# Patient Record
Sex: MALE | Race: WHITE | NOT HISPANIC OR LATINO | Employment: FULL TIME | URBAN - METROPOLITAN AREA
[De-identification: names, ages, dates, MRNs, and addresses within clinical notes are randomized per-mention and may not be internally consistent; named-entity substitution may affect disease eponyms.]

---

## 2023-03-16 LAB
ALANINE AMINOTRANSFERASE (SGPT) (U/L) IN SER/PLAS: 38 U/L (ref 10–52)
ALBUMIN (G/DL) IN SER/PLAS: 4.8 G/DL (ref 3.4–5)
ALKALINE PHOSPHATASE (U/L) IN SER/PLAS: 54 U/L (ref 33–136)
ANION GAP IN SER/PLAS: 12 MMOL/L (ref 10–20)
APPEARANCE, URINE: CLEAR
ASPARTATE AMINOTRANSFERASE (SGOT) (U/L) IN SER/PLAS: 32 U/L (ref 9–39)
BILIRUBIN TOTAL (MG/DL) IN SER/PLAS: 0.7 MG/DL (ref 0–1.2)
BILIRUBIN, URINE: NEGATIVE
BLOOD, URINE: ABNORMAL
CALCIUM (MG/DL) IN SER/PLAS: 10.1 MG/DL (ref 8.6–10.6)
CARBON DIOXIDE, TOTAL (MMOL/L) IN SER/PLAS: 30 MMOL/L (ref 21–32)
CHLORIDE (MMOL/L) IN SER/PLAS: 100 MMOL/L (ref 98–107)
COLOR, URINE: YELLOW
CREATININE (MG/DL) IN SER/PLAS: 1.29 MG/DL (ref 0.5–1.3)
ERYTHROCYTE DISTRIBUTION WIDTH (RATIO) BY AUTOMATED COUNT: 13.2 % (ref 11.5–14.5)
ERYTHROCYTE MEAN CORPUSCULAR HEMOGLOBIN CONCENTRATION (G/DL) BY AUTOMATED: 33.6 G/DL (ref 32–36)
ERYTHROCYTE MEAN CORPUSCULAR VOLUME (FL) BY AUTOMATED COUNT: 92 FL (ref 80–100)
ERYTHROCYTES (10*6/UL) IN BLOOD BY AUTOMATED COUNT: 4.75 X10E12/L (ref 4.5–5.9)
GFR MALE: 62 ML/MIN/1.73M2
GLUCOSE (MG/DL) IN SER/PLAS: 88 MG/DL (ref 74–99)
GLUCOSE, URINE: NEGATIVE MG/DL
HEMATOCRIT (%) IN BLOOD BY AUTOMATED COUNT: 43.8 % (ref 41–52)
HEMOGLOBIN (G/DL) IN BLOOD: 14.7 G/DL (ref 13.5–17.5)
KETONES, URINE: NEGATIVE MG/DL
LEUKOCYTE ESTERASE, URINE: NEGATIVE
LEUKOCYTES (10*3/UL) IN BLOOD BY AUTOMATED COUNT: 5.2 X10E9/L (ref 4.4–11.3)
MUCUS, URINE: ABNORMAL /LPF
NITRITE, URINE: NEGATIVE
NRBC (PER 100 WBCS) BY AUTOMATED COUNT: 0 /100 WBC (ref 0–0)
PH, URINE: 6 (ref 5–8)
PLATELETS (10*3/UL) IN BLOOD AUTOMATED COUNT: 177 X10E9/L (ref 150–450)
POTASSIUM (MMOL/L) IN SER/PLAS: 4.4 MMOL/L (ref 3.5–5.3)
PROSTATE SPECIFIC AG (NG/ML) IN SER/PLAS: 0.21 NG/ML (ref 0–4)
PROTEIN TOTAL: 7.5 G/DL (ref 6.4–8.2)
PROTEIN, URINE: ABNORMAL MG/DL
RBC, URINE: 10 /HPF (ref 0–5)
SEDIMENTATION RATE, ERYTHROCYTE: 15 MM/H (ref 0–20)
SODIUM (MMOL/L) IN SER/PLAS: 138 MMOL/L (ref 136–145)
SPECIFIC GRAVITY, URINE: 1.02 (ref 1–1.03)
UREA NITROGEN (MG/DL) IN SER/PLAS: 24 MG/DL (ref 6–23)
UROBILINOGEN, URINE: <2 MG/DL (ref 0–1.9)
WBC, URINE: ABNORMAL /HPF (ref 0–5)

## 2023-05-12 ENCOUNTER — OFFICE VISIT (OUTPATIENT)
Dept: PRIMARY CARE | Facility: CLINIC | Age: 63
End: 2023-05-12
Payer: COMMERCIAL

## 2023-05-12 VITALS
SYSTOLIC BLOOD PRESSURE: 114 MMHG | WEIGHT: 194 LBS | OXYGEN SATURATION: 96 % | TEMPERATURE: 97.3 F | DIASTOLIC BLOOD PRESSURE: 70 MMHG | RESPIRATION RATE: 12 BRPM | HEART RATE: 71 BPM | BODY MASS INDEX: 28.73 KG/M2 | HEIGHT: 69 IN

## 2023-05-12 DIAGNOSIS — R31.9 CHRONIC PROSTATITIS WITH HEMATURIA: ICD-10-CM

## 2023-05-12 DIAGNOSIS — I25.10 CAD IN NATIVE ARTERY: ICD-10-CM

## 2023-05-12 DIAGNOSIS — C61 ADENOCARCINOMA OF PROSTATE (MULTI): ICD-10-CM

## 2023-05-12 DIAGNOSIS — N41.1 CHRONIC PROSTATITIS WITH HEMATURIA: ICD-10-CM

## 2023-05-12 DIAGNOSIS — I71.21 ANEURYSM OF ASCENDING AORTA WITHOUT RUPTURE (CMS-HCC): ICD-10-CM

## 2023-05-12 DIAGNOSIS — Z00.00 HEALTHCARE MAINTENANCE: Primary | ICD-10-CM

## 2023-05-12 DIAGNOSIS — I10 PRIMARY HYPERTENSION: ICD-10-CM

## 2023-05-12 PROBLEM — R10.9 ABDOMINAL PAIN: Status: ACTIVE | Noted: 2023-05-12

## 2023-05-12 PROBLEM — R79.89 ABNORMAL CBC MEASUREMENT: Status: ACTIVE | Noted: 2023-05-12

## 2023-05-12 PROBLEM — K57.32 DIVERTICULITIS OF COLON: Status: RESOLVED | Noted: 2023-05-12 | Resolved: 2023-05-12

## 2023-05-12 PROBLEM — R39.89 SENSATION OF PRESSURE IN BLADDER AREA: Status: ACTIVE | Noted: 2023-05-12

## 2023-05-12 PROBLEM — E66.9 OBESITY: Status: ACTIVE | Noted: 2023-05-12

## 2023-05-12 PROBLEM — R97.20 ELEVATED PSA: Status: ACTIVE | Noted: 2023-05-12

## 2023-05-12 PROBLEM — R35.0 INCREASED URINARY FREQUENCY: Status: ACTIVE | Noted: 2023-05-12

## 2023-05-12 PROBLEM — N48.6 PEYRONIE'S DISEASE: Status: ACTIVE | Noted: 2023-05-12

## 2023-05-12 PROBLEM — R74.8 ELEVATED LIVER ENZYMES: Status: ACTIVE | Noted: 2023-05-12

## 2023-05-12 PROBLEM — B00.9 HERPES SIMPLEX: Status: ACTIVE | Noted: 2023-05-12

## 2023-05-12 PROBLEM — R09.81 NASAL CONGESTION: Status: ACTIVE | Noted: 2023-05-12

## 2023-05-12 PROBLEM — H66.91 OTITIS MEDIA, RIGHT: Status: ACTIVE | Noted: 2023-05-12

## 2023-05-12 PROBLEM — R09.81 NASAL CONGESTION: Status: RESOLVED | Noted: 2023-05-12 | Resolved: 2023-05-12

## 2023-05-12 PROBLEM — J01.90 ACUTE SINUSITIS: Status: ACTIVE | Noted: 2023-05-12

## 2023-05-12 PROBLEM — F10.10 ALCOHOL ABUSE, DAILY USE: Status: ACTIVE | Noted: 2023-05-12

## 2023-05-12 PROBLEM — K57.32 DIVERTICULITIS OF COLON: Status: ACTIVE | Noted: 2023-05-12

## 2023-05-12 PROBLEM — I71.20 THORACIC AORTIC ANEURYSM WITHOUT RUPTURE (CMS-HCC): Status: ACTIVE | Noted: 2023-05-12

## 2023-05-12 PROBLEM — F10.10 ALCOHOL ABUSE, DAILY USE: Status: RESOLVED | Noted: 2023-05-12 | Resolved: 2023-05-12

## 2023-05-12 PROBLEM — R35.0 INCREASED URINARY FREQUENCY: Status: RESOLVED | Noted: 2023-05-12 | Resolved: 2023-05-12

## 2023-05-12 PROBLEM — R10.9 ABDOMINAL PAIN: Status: RESOLVED | Noted: 2023-05-12 | Resolved: 2023-05-12

## 2023-05-12 PROBLEM — J01.90 ACUTE SINUSITIS: Status: RESOLVED | Noted: 2023-05-12 | Resolved: 2023-05-12

## 2023-05-12 PROBLEM — N52.9 MALE ERECTILE DISORDER: Status: ACTIVE | Noted: 2023-05-12

## 2023-05-12 PROBLEM — H66.91 OTITIS MEDIA, RIGHT: Status: RESOLVED | Noted: 2023-05-12 | Resolved: 2023-05-12

## 2023-05-12 PROBLEM — K64.9 HEMORRHOIDS: Status: ACTIVE | Noted: 2023-05-12

## 2023-05-12 PROCEDURE — 1036F TOBACCO NON-USER: CPT | Performed by: FAMILY MEDICINE

## 2023-05-12 PROCEDURE — 3074F SYST BP LT 130 MM HG: CPT | Performed by: FAMILY MEDICINE

## 2023-05-12 PROCEDURE — 99396 PREV VISIT EST AGE 40-64: CPT | Performed by: FAMILY MEDICINE

## 2023-05-12 PROCEDURE — 3078F DIAST BP <80 MM HG: CPT | Performed by: FAMILY MEDICINE

## 2023-05-12 RX ORDER — ATORVASTATIN CALCIUM 10 MG/1
1 TABLET, FILM COATED ORAL DAILY
COMMUNITY
Start: 2021-08-23 | End: 2023-05-12 | Stop reason: SDUPTHER

## 2023-05-12 RX ORDER — LOSARTAN POTASSIUM AND HYDROCHLOROTHIAZIDE 25; 100 MG/1; MG/1
1 TABLET ORAL DAILY
Qty: 90 TABLET | Refills: 1 | Status: SHIPPED | OUTPATIENT
Start: 2023-05-12 | End: 2023-05-17

## 2023-05-12 RX ORDER — TAMSULOSIN HYDROCHLORIDE 0.4 MG/1
1 CAPSULE ORAL DAILY
COMMUNITY
Start: 2019-12-26 | End: 2023-05-12 | Stop reason: SDUPTHER

## 2023-05-12 RX ORDER — TAMSULOSIN HYDROCHLORIDE 0.4 MG/1
0.4 CAPSULE ORAL DAILY
Qty: 90 CAPSULE | Refills: 1 | Status: SHIPPED | OUTPATIENT
Start: 2023-05-12 | End: 2024-05-13 | Stop reason: SDUPTHER

## 2023-05-12 RX ORDER — ATORVASTATIN CALCIUM 10 MG/1
20 TABLET, FILM COATED ORAL DAILY
Qty: 90 TABLET | Refills: 1 | Status: SHIPPED | OUTPATIENT
Start: 2023-05-12 | End: 2023-11-06 | Stop reason: WASHOUT

## 2023-05-12 RX ORDER — LOSARTAN POTASSIUM AND HYDROCHLOROTHIAZIDE 25; 100 MG/1; MG/1
1 TABLET ORAL DAILY
COMMUNITY
Start: 2019-02-06 | End: 2023-05-12 | Stop reason: SDUPTHER

## 2023-05-12 RX ORDER — ASPIRIN 81 MG/1
81 TABLET ORAL DAILY
Qty: 90 TABLET | Refills: 3 | Status: SHIPPED | OUTPATIENT
Start: 2023-05-12 | End: 2024-05-11

## 2023-05-12 ASSESSMENT — ENCOUNTER SYMPTOMS
DIFFICULTY URINATING: 0
BRUISES/BLEEDS EASILY: 0
DYSPHORIC MOOD: 0
ABDOMINAL DISTENTION: 0
CHEST TIGHTNESS: 0
EYE PAIN: 0
COUGH: 0
CONSTIPATION: 0
ABDOMINAL PAIN: 0
DYSURIA: 0
FATIGUE: 0
FEVER: 0
ARTHRALGIAS: 0
DIZZINESS: 0
WEAKNESS: 0
EYE REDNESS: 0
BACK PAIN: 0
HEADACHES: 0
SHORTNESS OF BREATH: 0
DIARRHEA: 0
APPETITE CHANGE: 0
SORE THROAT: 0
NERVOUS/ANXIOUS: 0
CHILLS: 0
ADENOPATHY: 0
BLOOD IN STOOL: 0

## 2023-05-12 NOTE — PROGRESS NOTES
"Subjective   Patient ID: Eliud Mckeon is a 62 y.o. male who presents for Annual Exam.  PMHX, PSHx, Fam hx, and Social hx reviewed.   New concerns none  Vaccines current  Dentist seen at least yearly yes  Vision concerns none  Hearing concerns none  Diet is  overall healthy.   Smoker - no  Alcohol use - 14 drinks/week  Exercising 4-5 days per week.   Sexually active - yes, no issues  Colonoscopy 2019           Review of Systems   Constitutional:  Negative for appetite change, chills, fatigue and fever.   HENT:  Negative for congestion, hearing loss and sore throat.    Eyes:  Negative for pain, redness and visual disturbance.   Respiratory:  Negative for cough, chest tightness and shortness of breath.    Cardiovascular:  Negative for chest pain and leg swelling.   Gastrointestinal:  Negative for abdominal distention, abdominal pain, blood in stool, constipation and diarrhea.   Genitourinary:  Negative for difficulty urinating and dysuria.   Musculoskeletal:  Negative for arthralgias and back pain.   Skin:  Negative for rash.   Neurological:  Negative for dizziness, weakness and headaches.   Hematological:  Negative for adenopathy. Does not bruise/bleed easily.   Psychiatric/Behavioral:  Negative for dysphoric mood. The patient is not nervous/anxious.        Objective   /70   Pulse 71   Temp 36.3 °C (97.3 °F)   Resp 12   Ht 1.753 m (5' 9\")   Wt 88 kg (194 lb)   SpO2 96%   BMI 28.65 kg/m²    Physical Exam  Constitutional:       General: He is not in acute distress.     Appearance: Normal appearance. He is not ill-appearing.   HENT:      Head: Normocephalic and atraumatic.      Right Ear: Tympanic membrane, ear canal and external ear normal.      Left Ear: Tympanic membrane, ear canal and external ear normal.      Nose: Nose normal.      Mouth/Throat:      Mouth: Mucous membranes are moist.      Pharynx: No oropharyngeal exudate or posterior oropharyngeal erythema.   Eyes:      Extraocular Movements: " Extraocular movements intact.      Conjunctiva/sclera: Conjunctivae normal.      Pupils: Pupils are equal, round, and reactive to light.   Neck:      Vascular: No carotid bruit.   Cardiovascular:      Rate and Rhythm: Normal rate and regular rhythm.      Heart sounds: Normal heart sounds. No murmur heard.  Pulmonary:      Effort: Pulmonary effort is normal.      Breath sounds: Normal breath sounds. No wheezing, rhonchi or rales.   Abdominal:      General: Bowel sounds are normal. There is no distension.      Palpations: Abdomen is soft. There is no mass.      Tenderness: There is no abdominal tenderness.   Musculoskeletal:         General: No swelling or deformity.      Cervical back: Neck supple. No tenderness.   Lymphadenopathy:      Cervical: No cervical adenopathy.   Skin:     General: Skin is warm and dry.      Findings: No lesion or rash.   Neurological:      Mental Status: He is alert and oriented to person, place, and time.      Sensory: No sensory deficit.      Motor: No weakness.      Coordination: Coordination normal.      Deep Tendon Reflexes: Reflexes normal.   Psychiatric:         Mood and Affect: Mood normal.         Behavior: Behavior normal.         Judgment: Judgment normal.           Assessment/Plan   Diagnoses and all orders for this visit:  Healthcare maintenance - vaccines current, labs reviewed and result. Colonoscopy current.  Primary hypertension - well controlled, continue to monitor  Aneurysm of ascending aorta without rupture (CMS/HCC) - recheck Echo  Adenocarcinoma of prostate (CMS/HCC) -post seeding, PSA stable  Chronic prostatitis with hematuria - stable, monitor.  Follow up in 6 months, 15min

## 2023-05-12 NOTE — PROGRESS NOTES
"Subjective   Patient ID: Eliud Mckeon is a 62 y.o. male who presents for Annual Exam.    HPI     Review of Systems    Objective   /70   Pulse 71   Temp 36.3 °C (97.3 °F)   Resp 12   Ht 1.753 m (5' 9\")   Wt 88 kg (194 lb)   SpO2 96%   BMI 28.65 kg/m²     Physical Exam    Assessment/Plan          "

## 2023-05-17 DIAGNOSIS — I10 PRIMARY HYPERTENSION: ICD-10-CM

## 2023-05-17 RX ORDER — LOSARTAN POTASSIUM AND HYDROCHLOROTHIAZIDE 25; 100 MG/1; MG/1
TABLET ORAL
Qty: 90 TABLET | Refills: 1 | Status: SHIPPED | OUTPATIENT
Start: 2023-05-17 | End: 2023-11-06 | Stop reason: SDUPTHER

## 2023-06-08 ENCOUNTER — TELEPHONE (OUTPATIENT)
Dept: PRIMARY CARE | Facility: CLINIC | Age: 63
End: 2023-06-08
Payer: COMMERCIAL

## 2023-06-08 NOTE — TELEPHONE ENCOUNTER
----- Message from Daniel Fitzpatrick MD sent at 6/7/2023  2:49 PM EDT -----  Echo shows mild ascending aortic aneurysm is stable. There is note of diastolic dysfunction for which no tx or further eval is needed, but good BP control is important. Will recheck Echo in 1-2yrs to monitor.  ----- Message -----  From: Interface, Syng[facility] - Cardiology Results In  Sent: 6/7/2023   2:05 PM EDT  To: Daniel Fitzpatrick MD

## 2023-07-04 DIAGNOSIS — I25.10 CAD IN NATIVE ARTERY: Primary | ICD-10-CM

## 2023-07-06 RX ORDER — ATORVASTATIN CALCIUM 20 MG/1
TABLET, FILM COATED ORAL
Qty: 45 TABLET | Refills: 1 | Status: SHIPPED | OUTPATIENT
Start: 2023-07-06 | End: 2023-11-06 | Stop reason: SDUPTHER

## 2023-10-31 ENCOUNTER — LAB (OUTPATIENT)
Dept: LAB | Facility: LAB | Age: 63
End: 2023-10-31
Payer: COMMERCIAL

## 2023-10-31 ENCOUNTER — TELEPHONE (OUTPATIENT)
Dept: PRIMARY CARE | Facility: CLINIC | Age: 63
End: 2023-10-31

## 2023-10-31 DIAGNOSIS — Z00.00 HEALTHCARE MAINTENANCE: ICD-10-CM

## 2023-10-31 DIAGNOSIS — I25.10 CAD IN NATIVE ARTERY: ICD-10-CM

## 2023-10-31 DIAGNOSIS — R31.1 BENIGN ESSENTIAL MICROSCOPIC HEMATURIA: ICD-10-CM

## 2023-10-31 DIAGNOSIS — R79.89 ABNORMAL CBC MEASUREMENT: ICD-10-CM

## 2023-10-31 DIAGNOSIS — I10 PRIMARY HYPERTENSION: Primary | ICD-10-CM

## 2023-10-31 LAB
ALBUMIN SERPL BCP-MCNC: 4.5 G/DL (ref 3.4–5)
ALP SERPL-CCNC: 53 U/L (ref 33–136)
ALT SERPL W P-5'-P-CCNC: 35 U/L (ref 10–52)
ANION GAP SERPL CALC-SCNC: 14 MMOL/L (ref 10–20)
APPEARANCE UR: CLEAR
AST SERPL W P-5'-P-CCNC: 33 U/L (ref 9–39)
BILIRUB SERPL-MCNC: 0.4 MG/DL (ref 0–1.2)
BILIRUB UR STRIP.AUTO-MCNC: NEGATIVE MG/DL
BUN SERPL-MCNC: 23 MG/DL (ref 6–23)
CALCIUM SERPL-MCNC: 9.5 MG/DL (ref 8.6–10.6)
CHLORIDE SERPL-SCNC: 99 MMOL/L (ref 98–107)
CHOLEST SERPL-MCNC: 149 MG/DL (ref 0–199)
CHOLESTEROL/HDL RATIO: 2.4
CO2 SERPL-SCNC: 27 MMOL/L (ref 21–32)
COLOR UR: YELLOW
CREAT SERPL-MCNC: 1.42 MG/DL (ref 0.5–1.3)
ERYTHROCYTE [DISTWIDTH] IN BLOOD BY AUTOMATED COUNT: 13 % (ref 11.5–14.5)
GFR SERPL CREATININE-BSD FRML MDRD: 56 ML/MIN/1.73M*2
GLUCOSE SERPL-MCNC: 97 MG/DL (ref 74–99)
GLUCOSE UR STRIP.AUTO-MCNC: NEGATIVE MG/DL
HCT VFR BLD AUTO: 41.3 % (ref 41–52)
HCV AB SER QL: NONREACTIVE
HDLC SERPL-MCNC: 63.4 MG/DL
HGB BLD-MCNC: 14.2 G/DL (ref 13.5–17.5)
KETONES UR STRIP.AUTO-MCNC: NEGATIVE MG/DL
LDLC SERPL CALC-MCNC: 73 MG/DL
LEUKOCYTE ESTERASE UR QL STRIP.AUTO: NEGATIVE
MCH RBC QN AUTO: 31.8 PG (ref 26–34)
MCHC RBC AUTO-ENTMCNC: 34.4 G/DL (ref 32–36)
MCV RBC AUTO: 93 FL (ref 80–100)
NITRITE UR QL STRIP.AUTO: NEGATIVE
NON HDL CHOLESTEROL: 86 MG/DL (ref 0–149)
NRBC BLD-RTO: 0 /100 WBCS (ref 0–0)
PH UR STRIP.AUTO: 6 [PH]
PLATELET # BLD AUTO: 215 X10*3/UL (ref 150–450)
PMV BLD AUTO: 10.4 FL (ref 7.5–11.5)
POTASSIUM SERPL-SCNC: 4.1 MMOL/L (ref 3.5–5.3)
PROT SERPL-MCNC: 6.9 G/DL (ref 6.4–8.2)
PROT UR STRIP.AUTO-MCNC: ABNORMAL MG/DL
RBC # BLD AUTO: 4.46 X10*6/UL (ref 4.5–5.9)
RBC # UR STRIP.AUTO: ABNORMAL /UL
RBC #/AREA URNS AUTO: NORMAL /HPF
SODIUM SERPL-SCNC: 136 MMOL/L (ref 136–145)
SP GR UR STRIP.AUTO: 1.01
TRIGL SERPL-MCNC: 63 MG/DL (ref 0–149)
UROBILINOGEN UR STRIP.AUTO-MCNC: <2 MG/DL
VLDL: 13 MG/DL (ref 0–40)
WBC # BLD AUTO: 4.6 X10*3/UL (ref 4.4–11.3)
WBC #/AREA URNS AUTO: NORMAL /HPF

## 2023-10-31 PROCEDURE — 85027 COMPLETE CBC AUTOMATED: CPT

## 2023-10-31 PROCEDURE — 80053 COMPREHEN METABOLIC PANEL: CPT

## 2023-10-31 PROCEDURE — 81001 URINALYSIS AUTO W/SCOPE: CPT

## 2023-10-31 PROCEDURE — 36415 COLL VENOUS BLD VENIPUNCTURE: CPT

## 2023-10-31 PROCEDURE — 86803 HEPATITIS C AB TEST: CPT

## 2023-10-31 PROCEDURE — 80061 LIPID PANEL: CPT

## 2023-11-06 ENCOUNTER — ANCILLARY PROCEDURE (OUTPATIENT)
Dept: RADIOLOGY | Facility: CLINIC | Age: 63
End: 2023-11-06
Payer: COMMERCIAL

## 2023-11-06 ENCOUNTER — OFFICE VISIT (OUTPATIENT)
Dept: PRIMARY CARE | Facility: CLINIC | Age: 63
End: 2023-11-06
Payer: COMMERCIAL

## 2023-11-06 VITALS
RESPIRATION RATE: 12 BRPM | WEIGHT: 199 LBS | OXYGEN SATURATION: 96 % | BODY MASS INDEX: 29.47 KG/M2 | HEIGHT: 69 IN | SYSTOLIC BLOOD PRESSURE: 118 MMHG | HEART RATE: 76 BPM | DIASTOLIC BLOOD PRESSURE: 64 MMHG

## 2023-11-06 DIAGNOSIS — C61 ADENOCARCINOMA OF PROSTATE (MULTI): ICD-10-CM

## 2023-11-06 DIAGNOSIS — E78.00 ELEVATED LDL CHOLESTEROL LEVEL: ICD-10-CM

## 2023-11-06 DIAGNOSIS — R07.89 CHEST TIGHTNESS: Primary | ICD-10-CM

## 2023-11-06 DIAGNOSIS — I10 PRIMARY HYPERTENSION: ICD-10-CM

## 2023-11-06 DIAGNOSIS — I25.10 CAD IN NATIVE ARTERY: ICD-10-CM

## 2023-11-06 DIAGNOSIS — I71.21 ANEURYSM OF ASCENDING AORTA WITHOUT RUPTURE (CMS-HCC): ICD-10-CM

## 2023-11-06 DIAGNOSIS — R07.89 CHEST TIGHTNESS: ICD-10-CM

## 2023-11-06 PROCEDURE — 99214 OFFICE O/P EST MOD 30 MIN: CPT | Performed by: FAMILY MEDICINE

## 2023-11-06 PROCEDURE — 3074F SYST BP LT 130 MM HG: CPT | Performed by: FAMILY MEDICINE

## 2023-11-06 PROCEDURE — 71046 X-RAY EXAM CHEST 2 VIEWS: CPT

## 2023-11-06 PROCEDURE — 1036F TOBACCO NON-USER: CPT | Performed by: FAMILY MEDICINE

## 2023-11-06 PROCEDURE — 93000 ELECTROCARDIOGRAM COMPLETE: CPT | Performed by: FAMILY MEDICINE

## 2023-11-06 PROCEDURE — 71046 X-RAY EXAM CHEST 2 VIEWS: CPT | Performed by: RADIOLOGY

## 2023-11-06 PROCEDURE — 3078F DIAST BP <80 MM HG: CPT | Performed by: FAMILY MEDICINE

## 2023-11-06 RX ORDER — LOSARTAN POTASSIUM AND HYDROCHLOROTHIAZIDE 25; 100 MG/1; MG/1
1 TABLET ORAL DAILY
Qty: 90 TABLET | Refills: 1 | Status: SHIPPED | OUTPATIENT
Start: 2023-11-06 | End: 2024-05-13 | Stop reason: SDUPTHER

## 2023-11-06 RX ORDER — ATORVASTATIN CALCIUM 20 MG/1
TABLET, FILM COATED ORAL
Qty: 90 TABLET | Refills: 1 | Status: SHIPPED | OUTPATIENT
Start: 2023-11-06 | End: 2024-05-13 | Stop reason: SDUPTHER

## 2023-11-06 ASSESSMENT — ENCOUNTER SYMPTOMS
COUGH: 0
CONSTIPATION: 0
APPETITE CHANGE: 0
BLOOD IN STOOL: 0
DIARRHEA: 0
FEVER: 0
DYSURIA: 0
DYSPHORIC MOOD: 0
CHEST TIGHTNESS: 1
DIZZINESS: 0
ABDOMINAL PAIN: 0
ARTHRALGIAS: 0
DIFFICULTY URINATING: 0
NERVOUS/ANXIOUS: 0
HEADACHES: 0
BRUISES/BLEEDS EASILY: 0
SORE THROAT: 0
ABDOMINAL DISTENTION: 0
SHORTNESS OF BREATH: 0
EYE PAIN: 0
CHILLS: 0
EYE REDNESS: 0
BACK PAIN: 0
FATIGUE: 0
WEAKNESS: 0
ADENOPATHY: 0

## 2023-11-06 NOTE — PROGRESS NOTES
"Subjective   Patient ID: Eliud Mckeon is a 63 y.o. male who presents for Follow-up.    HPI     Review of Systems    Objective   /64   Pulse 76   Resp 12   Ht 1.753 m (5' 9\")   Wt 90.3 kg (199 lb)   SpO2 96%   BMI 29.39 kg/m²     Physical Exam    Assessment/Plan          "

## 2023-11-06 NOTE — PROGRESS NOTES
Subjective   Patient ID: Eliud Mckeon is a 63 y.o. male who presents for Follow-up.  Pt has chest tightness that comes and goes about 2-3 days per week. Lasts seconds and is positional. He walks for exercise and tightness has not been exertional . Onset was 2months.    Pt reports symptoms are unchanged. He has also had an episode of LH about 2weeks ago. He thinks due to dehydration and didn't eat breakfast.      Pt has chronic and stable HTN, ascending aortic aneurysm and CAD.  Pt is taking Losart/HCTZ. Tolerating well.  Exercising 4-5 days per week   Low sodium diet is not being followed.   Is monitoring home blood pressures. Readings range 120s/80s.  Denies HA, vision changes.     Pt has Dyslipidemia.   Lipid panel showed LDL 73.  Currently taking Atorvastatin and is tolerating well without muscle pains or weakness.     BPH/ Prostate Cancer/ Hematuria monitored . Last PSA was 0.4, a little higher . Urinary stream is good and he denies difficulty starting urination or emptying bladder.                 Review of Systems   Constitutional:  Negative for appetite change, chills, fatigue and fever.   HENT:  Negative for congestion, hearing loss and sore throat.    Eyes:  Negative for pain, redness and visual disturbance.   Respiratory:  Positive for chest tightness. Negative for cough and shortness of breath.    Cardiovascular:  Negative for chest pain and leg swelling.   Gastrointestinal:  Negative for abdominal distention, abdominal pain, blood in stool, constipation and diarrhea.   Genitourinary:  Negative for difficulty urinating and dysuria.   Musculoskeletal:  Negative for arthralgias and back pain.   Skin:  Negative for rash.   Neurological:  Negative for dizziness, weakness and headaches.   Hematological:  Negative for adenopathy. Does not bruise/bleed easily.   Psychiatric/Behavioral:  Negative for dysphoric mood. The patient is not nervous/anxious.        Objective   /64   Pulse 76   Resp 12   Ht  "1.753 m (5' 9\")   Wt 90.3 kg (199 lb)   SpO2 96%   BMI 29.39 kg/m²    Physical Exam  Constitutional:       General: He is not in acute distress.     Appearance: Normal appearance.   Cardiovascular:      Rate and Rhythm: Normal rate and regular rhythm.      Heart sounds: Normal heart sounds. No murmur heard.  Pulmonary:      Effort: Pulmonary effort is normal.      Breath sounds: Normal breath sounds.   Chest:      Chest wall: No tenderness.   Abdominal:      Palpations: Abdomen is soft.      Tenderness: There is no abdominal tenderness.   Neurological:      Mental Status: He is alert.   Psychiatric:         Mood and Affect: Mood normal.         Judgment: Judgment normal.           Assessment/Plan   Diagnoses and all orders for this visit:  Chest tightness - EKG ok, ordering stress test and CXR.  CAD in native artery - stable, continue ASA and statin.  Primary hypertension - very well controlled, continue current dose on BP med  Ascending aortic aneurysm - due to recheck, will check with Echo.  BPH / Prostate Ca - doing well monitoring PSA with Urology.    Follow up in 6 months, 30min for preventative       "

## 2023-11-07 ENCOUNTER — TELEPHONE (OUTPATIENT)
Dept: PRIMARY CARE | Facility: CLINIC | Age: 63
End: 2023-11-07
Payer: COMMERCIAL

## 2023-11-07 NOTE — TELEPHONE ENCOUNTER
----- Message from Daniel Fitzpatrick MD sent at 11/7/2023 11:14 AM EST -----  CXR is negative  ----- Message -----  From: Interface, Radiology Results In  Sent: 11/7/2023  10:55 AM EST  To: Daniel Fitzpatrick MD

## 2023-11-08 DIAGNOSIS — R31.9 CHRONIC PROSTATITIS WITH HEMATURIA: ICD-10-CM

## 2023-11-08 DIAGNOSIS — N41.1 CHRONIC PROSTATITIS WITH HEMATURIA: ICD-10-CM

## 2023-11-17 ENCOUNTER — APPOINTMENT (OUTPATIENT)
Dept: PRIMARY CARE | Facility: CLINIC | Age: 63
End: 2023-11-17
Payer: COMMERCIAL

## 2023-12-19 ENCOUNTER — HOSPITAL ENCOUNTER (OUTPATIENT)
Dept: CARDIOLOGY | Facility: CLINIC | Age: 63
Discharge: HOME | End: 2023-12-19
Payer: COMMERCIAL

## 2023-12-19 VITALS
BODY MASS INDEX: 29.47 KG/M2 | HEIGHT: 69 IN | SYSTOLIC BLOOD PRESSURE: 118 MMHG | WEIGHT: 199 LBS | DIASTOLIC BLOOD PRESSURE: 64 MMHG

## 2023-12-19 DIAGNOSIS — I71.21 ANEURYSM OF ASCENDING AORTA WITHOUT RUPTURE (CMS-HCC): ICD-10-CM

## 2023-12-19 DIAGNOSIS — R07.89 CHEST TIGHTNESS: ICD-10-CM

## 2023-12-19 LAB
AORTIC VALVE PEAK VELOCITY: 1.47
AV PEAK GRADIENT: 8.6
AVA (PEAK VEL): 3.22
LEFT ATRIUM VOLUME AREA LENGTH INDEX BSA: 41.6
LEFT VENTRICLE INTERNAL DIMENSION DIASTOLE: 4.78 (ref 3.5–6)
LEFT VENTRICULAR OUTFLOW TRACT DIAMETER: 2.22
MITRAL VALVE E/A RATIO: 0.82
MITRAL VALVE E/E' RATIO: 7.37
RIGHT VENTRICLE FREE WALL PEAK S': 12.62
RIGHT VENTRICLE PEAK SYSTOLIC PRESSURE: 22
TRICUSPID ANNULAR PLANE SYSTOLIC EXCURSION: 2.8

## 2023-12-19 PROCEDURE — 93306 TTE W/DOPPLER COMPLETE: CPT | Performed by: INTERNAL MEDICINE

## 2023-12-19 PROCEDURE — 93306 TTE W/DOPPLER COMPLETE: CPT

## 2023-12-20 ENCOUNTER — HOSPITAL ENCOUNTER (OUTPATIENT)
Dept: CARDIOLOGY | Facility: HOSPITAL | Age: 63
Discharge: HOME | End: 2023-12-20
Payer: COMMERCIAL

## 2023-12-20 DIAGNOSIS — R07.89 CHEST TIGHTNESS: ICD-10-CM

## 2023-12-20 PROCEDURE — 93016 CV STRESS TEST SUPVJ ONLY: CPT | Performed by: STUDENT IN AN ORGANIZED HEALTH CARE EDUCATION/TRAINING PROGRAM

## 2023-12-20 PROCEDURE — 93017 CV STRESS TEST TRACING ONLY: CPT

## 2023-12-20 PROCEDURE — 93018 CV STRESS TEST I&R ONLY: CPT | Performed by: STUDENT IN AN ORGANIZED HEALTH CARE EDUCATION/TRAINING PROGRAM

## 2023-12-21 ENCOUNTER — TELEPHONE (OUTPATIENT)
Dept: PRIMARY CARE | Facility: CLINIC | Age: 63
End: 2023-12-21
Payer: COMMERCIAL

## 2023-12-21 NOTE — TELEPHONE ENCOUNTER
----- Message from Daniel Fitzpatrick MD sent at 12/20/2023  5:59 PM EST -----  Stress test is normal  ----- Message -----  From: Esequiel, [facility]ng[facility] - Cardiology Results In  Sent: 12/20/2023   5:19 PM EST  To: Daniel Fitzpatrick MD

## 2023-12-21 NOTE — TELEPHONE ENCOUNTER
----- Message from Daniel Fitzpatrick MD sent at 12/19/2023 10:49 AM EST -----  Echo stable, ascending aortic aneurysm unchanged. Will monitor with Echo or CT at 1yr  ----- Message -----  From: Esequiel, [facility]ng[facility] - Cardiology Results In  Sent: 12/19/2023  10:42 AM EST  To: Daniel Fitzpatrick MD

## 2023-12-29 RX ORDER — TAMSULOSIN HYDROCHLORIDE 0.4 MG/1
0.4 CAPSULE ORAL DAILY
Qty: 90 CAPSULE | Refills: 1 | OUTPATIENT
Start: 2023-12-29

## 2024-03-04 DIAGNOSIS — C61 MALIGNANT NEOPLASM OF PROSTATE (MULTI): Primary | ICD-10-CM

## 2024-03-11 ENCOUNTER — TELEPHONE (OUTPATIENT)
Dept: PRIMARY CARE | Facility: CLINIC | Age: 64
End: 2024-03-11
Payer: COMMERCIAL

## 2024-03-11 ENCOUNTER — APPOINTMENT (OUTPATIENT)
Dept: PRIMARY CARE | Facility: CLINIC | Age: 64
End: 2024-03-11
Payer: COMMERCIAL

## 2024-03-11 NOTE — TELEPHONE ENCOUNTER
Pt wife LVM stating pt is having issues with dizziness. Unable to contact pt or wife to set appt for sick visit.

## 2024-03-21 ENCOUNTER — TELEPHONE (OUTPATIENT)
Dept: RADIATION ONCOLOGY | Facility: HOSPITAL | Age: 64
End: 2024-03-21
Payer: COMMERCIAL

## 2024-03-21 NOTE — TELEPHONE ENCOUNTER
Called pt. to remind of appointment on 3/26/2026 at 10:30 am with RAÚL Mckeon. Pt's phone went to voicemail left number if needs to reschedule.

## 2024-03-22 ENCOUNTER — LAB (OUTPATIENT)
Dept: LAB | Facility: LAB | Age: 64
End: 2024-03-22
Payer: COMMERCIAL

## 2024-03-22 DIAGNOSIS — C61 MALIGNANT NEOPLASM OF PROSTATE (MULTI): ICD-10-CM

## 2024-03-22 LAB — PSA SERPL-MCNC: 0.23 NG/ML

## 2024-03-22 PROCEDURE — 84153 ASSAY OF PSA TOTAL: CPT

## 2024-03-22 PROCEDURE — 36415 COLL VENOUS BLD VENIPUNCTURE: CPT

## 2024-04-02 ENCOUNTER — TELEPHONE (OUTPATIENT)
Dept: RADIATION ONCOLOGY | Facility: HOSPITAL | Age: 64
End: 2024-04-02
Payer: COMMERCIAL

## 2024-04-02 NOTE — PROGRESS NOTES
Cancer synopsis:  Rad/onc: Weisent CNP    63 year old man with intermediate risk prostate cancer, [id number], Joshua 3+4=7 with 6/12 involved cores, pretreatment PSA 5 ng/mL. After discussing the various treatment options, the patient  opted for prostate seed implant brachytherapy.      Technical Summary:   Region(s) Treated: Prostate   Radiation Dose Prescribed: 144 Gy, minimal peripheral dose.   Radiation Technique/Machine Used: LDR prostate brachytherapy implant using 13 needles and 41 I-125 seeds. Additional radiation technical details available in our radiation oncology EMR [employer] and our treatment planning system. Radiation Treatment Date: 8/10/2020     History of presenting illness:    Patient ID: [id number]     Eliud Mckeon is a 63 y.o. male who presents for FIR prostate cancer GG2 IPSA 5 [id number] now s/p PSI w/put ADT.    RT Site: prostate  RT Date: 08/2020  Hormone therapy: No  Hot Flushes: Denies  Fatigue: Denies  Bone pain: Denies  ED: Patient does report some sexual dysfunction but believes to be age related and unconcerned at this time   - Quality of erections during the last 4 weeks: 3 = Firm enough for masturbation and foreplay only  - Use of erectile dysfunction medications:  None  IPSS: virtual  Urinary symptoms: Reports frequency but manageable. Did try Cialis in the past with SE of light headedness that resulted in patient stopping medication.   Urinary Medications: Yes, flowmax daily  Rectal bleeding: Denies  Colonoscopy: 12/2019, diverticula noted and non-bleeding internal heemoroids, next in 5yrs  Other systems: Denies      Review of systems:  Review of Systems   Constitutional:  Negative for fatigue, fever and unexpected weight change.   Respiratory:  Negative for cough, chest tightness and shortness of breath.    Cardiovascular:  Negative for chest pain, palpitations and leg swelling.   Gastrointestinal:  Negative for abdominal pain, anal bleeding, blood in stool, constipation, diarrhea and  rectal pain.   Endocrine: Negative for cold intolerance, heat intolerance and polyuria.   Genitourinary:  Negative for decreased urine volume, difficulty urinating, dysuria, frequency, hematuria and urgency.   Musculoskeletal:  Negative for arthralgias, back pain, gait problem and joint swelling.   Skin: Negative.    Allergic/Immunologic: Negative.    Neurological:  Negative for dizziness, syncope and weakness.   Psychiatric/Behavioral: Negative.         Past Medical history  Past Medical History:   Diagnosis Date    Abdominal pain 05/12/2023    Alcohol abuse, daily use 05/12/2023    Blepharospasm 08/31/2018    Blepharospasm    Impaired fasting glucose 12/02/2020    IFG (impaired fasting glucose)    Other urticaria 06/16/2017    Urticaria, acute    Personal history of other diseases of male genital organs     History of prostatitis    Personal history of other diseases of urinary system 01/19/2021    History of renal insufficiency syndrome    Personal history of other drug therapy 09/24/2015    History of influenza vaccination    Personal history of other infectious and parasitic diseases 08/19/2016    History of scabies    Personal history of other infectious and parasitic diseases 08/07/2014    History of condyloma acuminatum    Personal history of other specified conditions 04/15/2016    History of urinary urgency    Personal history of other specified conditions 02/22/2016    History of fatigue    Personal history of other specified conditions 11/17/2014    History of dysuria    Personal history of other specified conditions 04/15/2016    History of urinary frequency    Personal history of other specified conditions 09/24/2015    History of abdominal pain    Rash and other nonspecific skin eruption 10/13/2017    Skin rash        Surgical/family history  Family History   Problem Relation Name Age of Onset    Pancreatic cancer Mother      Kidney disease Father        Past Surgical History:   Procedure Laterality  Date    OTHER SURGICAL HISTORY  01/19/2021    Prostate brachytherapy        Social History  Tobacco Use: Low Risk  (11/6/2023)    Patient History     Smoking Tobacco Use: Never     Smokeless Tobacco Use: Never     Passive Exposure: Not on file         Current med list:  Current Outpatient Medications   Medication Instructions    aspirin 81 mg, oral, Daily    atorvastatin (Lipitor) 20 mg tablet TAKE ONE-HALF (1/2) TABLET DAILY    losartan-hydrochlorothiazide (Hyzaar) 100-25 mg tablet 1 tablet, oral, Daily    tamsulosin (FLOMAX) 0.4 mg, oral, Daily        Last recorded vital:  virtual    Physical exam  virtual    Pertinent labs:  Prostate Specific AG   Date/Time Value Ref Range Status   03/22/2024 08:28 AM 0.23 <=4.00 ng/mL Final         Dx:  Problem List Items Addressed This Visit    None   PSA of 0.23 was reviewed and is declining nicely. Review of latent SE including rectal bleeding, hematuria, urinary strictures, ED where reviewed as well as how to contact office if s/s present. Denies latent SE. NCCN guidelines where reviewed and routine FUV of every 3m for first year and every 6m for four years for a total of five years was discussed. Patient verbalized understanding.        PLAN:  FUV 6m  Labs PSA in 6m  Imaging none  Flowmax daily  FUV other providers: PCP for routine evals      Please contact office with any concerns:  Niranjan Christina CNP  [phone]    I performed this visit using realtime telehealth tools, including an audio/video OR telephone connection between  patient’s name and location Eliud Mckeon and Niranjan Mckeon CNP.      1. POS 02: Telehealth provided other than in patient's home.  o Patient is not located in their home when receiving health services or health  related services through telecommunication technology.

## 2024-04-03 ENCOUNTER — HOSPITAL ENCOUNTER (OUTPATIENT)
Dept: RADIATION ONCOLOGY | Facility: HOSPITAL | Age: 64
Setting detail: RADIATION/ONCOLOGY SERIES
Discharge: HOME | End: 2024-04-03
Payer: COMMERCIAL

## 2024-04-03 DIAGNOSIS — C61 MALIGNANT NEOPLASM OF PROSTATE (MULTI): ICD-10-CM

## 2024-04-03 DIAGNOSIS — C61 ADENOCARCINOMA OF PROSTATE (MULTI): Primary | ICD-10-CM

## 2024-04-03 PROCEDURE — 99213 OFFICE O/P EST LOW 20 MIN: CPT

## 2024-04-03 ASSESSMENT — ENCOUNTER SYMPTOMS
WEAKNESS: 0
DIZZINESS: 0
FREQUENCY: 0
CHEST TIGHTNESS: 0
DIARRHEA: 0
COUGH: 0
UNEXPECTED WEIGHT CHANGE: 0
DIFFICULTY URINATING: 0
CONSTIPATION: 0
BACK PAIN: 0
FEVER: 0
BLOOD IN STOOL: 0
HEMATURIA: 0
PSYCHIATRIC NEGATIVE: 1
ANAL BLEEDING: 0
DYSURIA: 0
JOINT SWELLING: 0
RECTAL PAIN: 0
ARTHRALGIAS: 0
ALLERGIC/IMMUNOLOGIC NEGATIVE: 1
PALPITATIONS: 0
FATIGUE: 0
SHORTNESS OF BREATH: 0
ABDOMINAL PAIN: 0

## 2024-04-30 ENCOUNTER — LAB (OUTPATIENT)
Dept: LAB | Facility: LAB | Age: 64
End: 2024-04-30
Payer: COMMERCIAL

## 2024-04-30 DIAGNOSIS — E78.00 ELEVATED LDL CHOLESTEROL LEVEL: ICD-10-CM

## 2024-04-30 LAB
ALBUMIN SERPL BCP-MCNC: 4.6 G/DL (ref 3.4–5)
ALP SERPL-CCNC: 50 U/L (ref 33–136)
ALT SERPL W P-5'-P-CCNC: 39 U/L (ref 10–52)
ANION GAP SERPL CALC-SCNC: 15 MMOL/L (ref 10–20)
AST SERPL W P-5'-P-CCNC: 34 U/L (ref 9–39)
BILIRUB SERPL-MCNC: 0.8 MG/DL (ref 0–1.2)
BUN SERPL-MCNC: 17 MG/DL (ref 6–23)
CALCIUM SERPL-MCNC: 9.5 MG/DL (ref 8.6–10.6)
CHLORIDE SERPL-SCNC: 100 MMOL/L (ref 98–107)
CHOLEST SERPL-MCNC: 161 MG/DL (ref 0–199)
CHOLESTEROL/HDL RATIO: 2.4
CO2 SERPL-SCNC: 27 MMOL/L (ref 21–32)
CREAT SERPL-MCNC: 1.21 MG/DL (ref 0.5–1.3)
EGFRCR SERPLBLD CKD-EPI 2021: 67 ML/MIN/1.73M*2
GLUCOSE SERPL-MCNC: 87 MG/DL (ref 74–99)
HDLC SERPL-MCNC: 68 MG/DL
LDLC SERPL CALC-MCNC: 82 MG/DL
NON HDL CHOLESTEROL: 93 MG/DL (ref 0–149)
POTASSIUM SERPL-SCNC: 4.5 MMOL/L (ref 3.5–5.3)
PROT SERPL-MCNC: 7.2 G/DL (ref 6.4–8.2)
SODIUM SERPL-SCNC: 137 MMOL/L (ref 136–145)
TRIGL SERPL-MCNC: 53 MG/DL (ref 0–149)
VLDL: 11 MG/DL (ref 0–40)

## 2024-04-30 PROCEDURE — 80053 COMPREHEN METABOLIC PANEL: CPT

## 2024-04-30 PROCEDURE — 80061 LIPID PANEL: CPT

## 2024-04-30 PROCEDURE — 36415 COLL VENOUS BLD VENIPUNCTURE: CPT

## 2024-05-02 DIAGNOSIS — I10 PRIMARY HYPERTENSION: ICD-10-CM

## 2024-05-13 ENCOUNTER — OFFICE VISIT (OUTPATIENT)
Dept: PRIMARY CARE | Facility: CLINIC | Age: 64
End: 2024-05-13
Payer: COMMERCIAL

## 2024-05-13 VITALS
HEIGHT: 69 IN | HEART RATE: 74 BPM | OXYGEN SATURATION: 95 % | BODY MASS INDEX: 30.36 KG/M2 | RESPIRATION RATE: 12 BRPM | WEIGHT: 205 LBS | SYSTOLIC BLOOD PRESSURE: 118 MMHG | DIASTOLIC BLOOD PRESSURE: 64 MMHG

## 2024-05-13 DIAGNOSIS — Z00.00 HEALTHCARE MAINTENANCE: Primary | ICD-10-CM

## 2024-05-13 DIAGNOSIS — I71.21 ANEURYSM OF ASCENDING AORTA WITHOUT RUPTURE (CMS-HCC): ICD-10-CM

## 2024-05-13 DIAGNOSIS — I10 PRIMARY HYPERTENSION: ICD-10-CM

## 2024-05-13 DIAGNOSIS — R31.9 CHRONIC PROSTATITIS WITH HEMATURIA: ICD-10-CM

## 2024-05-13 DIAGNOSIS — N41.1 CHRONIC PROSTATITIS WITH HEMATURIA: ICD-10-CM

## 2024-05-13 DIAGNOSIS — I25.10 CAD IN NATIVE ARTERY: ICD-10-CM

## 2024-05-13 DIAGNOSIS — C61 ADENOCARCINOMA OF PROSTATE (MULTI): ICD-10-CM

## 2024-05-13 PROBLEM — R97.20 ELEVATED PSA: Status: RESOLVED | Noted: 2023-05-12 | Resolved: 2024-05-13

## 2024-05-13 PROBLEM — R74.8 ELEVATED LIVER ENZYMES: Status: RESOLVED | Noted: 2023-05-12 | Resolved: 2024-05-13

## 2024-05-13 PROCEDURE — 3078F DIAST BP <80 MM HG: CPT | Performed by: FAMILY MEDICINE

## 2024-05-13 PROCEDURE — 99396 PREV VISIT EST AGE 40-64: CPT | Performed by: FAMILY MEDICINE

## 2024-05-13 PROCEDURE — 99213 OFFICE O/P EST LOW 20 MIN: CPT | Performed by: FAMILY MEDICINE

## 2024-05-13 PROCEDURE — 3074F SYST BP LT 130 MM HG: CPT | Performed by: FAMILY MEDICINE

## 2024-05-13 PROCEDURE — 1036F TOBACCO NON-USER: CPT | Performed by: FAMILY MEDICINE

## 2024-05-13 RX ORDER — LOSARTAN POTASSIUM AND HYDROCHLOROTHIAZIDE 25; 100 MG/1; MG/1
1 TABLET ORAL DAILY
Qty: 90 TABLET | Refills: 1 | Status: SHIPPED | OUTPATIENT
Start: 2024-05-13

## 2024-05-13 RX ORDER — TAMSULOSIN HYDROCHLORIDE 0.4 MG/1
0.4 CAPSULE ORAL DAILY
Qty: 90 CAPSULE | Refills: 1 | Status: SHIPPED | OUTPATIENT
Start: 2024-05-13

## 2024-05-13 RX ORDER — ATORVASTATIN CALCIUM 20 MG/1
TABLET, FILM COATED ORAL
Qty: 90 TABLET | Refills: 1 | Status: SHIPPED | OUTPATIENT
Start: 2024-05-13

## 2024-05-13 ASSESSMENT — ENCOUNTER SYMPTOMS
BACK PAIN: 0
ABDOMINAL PAIN: 0
ABDOMINAL DISTENTION: 0
CHILLS: 0
HEADACHES: 0
BRUISES/BLEEDS EASILY: 0
EYE REDNESS: 0
CHEST TIGHTNESS: 0
ARTHRALGIAS: 0
DIARRHEA: 0
EYE PAIN: 0
SORE THROAT: 0
SHORTNESS OF BREATH: 0
DYSURIA: 0
DIFFICULTY URINATING: 0
FEVER: 0
ADENOPATHY: 0
DIZZINESS: 0
APPETITE CHANGE: 0
WEAKNESS: 0
CONSTIPATION: 0
FATIGUE: 0
DYSPHORIC MOOD: 0
COUGH: 0
NERVOUS/ANXIOUS: 0
BLOOD IN STOOL: 0

## 2024-05-13 NOTE — PROGRESS NOTES
"Subjective   Patient ID: Eliud Mckeon is a 63 y.o. male who presents for Annual Exam.    HPI     Review of Systems    Objective   /64   Pulse 74   Resp 12   Ht 1.753 m (5' 9\")   Wt 93 kg (205 lb)   SpO2 95%   BMI 30.27 kg/m²     Physical Exam    Assessment/Plan          "

## 2024-05-13 NOTE — PROGRESS NOTES
"Subjective   Patient ID: Eliud Mckeon is a 63 y.o. male who presents for Annual Exam.  PMHX, PSHx, Fam hx, and Social hx reviewed.   New concerns none  Vaccines current  Dentist seen at least yearly yes  Vision concerns none  Hearing concerns none  Diet is usually overall healthy.   Smoker - no  Alcohol use - 2 drinks per night  Exercising 3-4 days per week.   Sexually active - yes, no issues  Colonoscopy 2019, current     Pt has chronic HTN, Ascending aortic aneursym.  Pt is taking Losar/HCTZ. Tolerating well.  Exercising 3-4 days per week   Low sodium diet is usually being followed.   Is not monitoring home blood pressures.  Denies HA, vision changes or CP.     Pt has Dyslipidemia.   Lipid panel showed LDL 82.  Currently taking Atorvastatin and is tolerating well without muscle pains or weakness.     For Prostate cancer/ BPH doing well on Flomax and surveillance with Urology.        Review of Systems   Constitutional:  Negative for appetite change, chills, fatigue and fever.   HENT:  Negative for congestion, hearing loss and sore throat.    Eyes:  Negative for pain, redness and visual disturbance.   Respiratory:  Negative for cough, chest tightness and shortness of breath.    Cardiovascular:  Negative for chest pain and leg swelling.   Gastrointestinal:  Negative for abdominal distention, abdominal pain, blood in stool, constipation and diarrhea.   Genitourinary:  Negative for difficulty urinating and dysuria.   Musculoskeletal:  Negative for arthralgias and back pain.   Skin:  Negative for rash.   Neurological:  Negative for dizziness, weakness and headaches.   Hematological:  Negative for adenopathy. Does not bruise/bleed easily.   Psychiatric/Behavioral:  Negative for dysphoric mood. The patient is not nervous/anxious.        Objective   /64   Pulse 74   Resp 12   Ht 1.753 m (5' 9\")   Wt 93 kg (205 lb)   SpO2 95%   BMI 30.27 kg/m²    Physical Exam  Constitutional:       General: He is not in " acute distress.     Appearance: Normal appearance. He is not ill-appearing.   HENT:      Head: Normocephalic and atraumatic.      Right Ear: Tympanic membrane, ear canal and external ear normal.      Left Ear: Tympanic membrane, ear canal and external ear normal.      Nose: Nose normal.      Mouth/Throat:      Mouth: Mucous membranes are moist.      Pharynx: No oropharyngeal exudate or posterior oropharyngeal erythema.   Eyes:      Extraocular Movements: Extraocular movements intact.      Conjunctiva/sclera: Conjunctivae normal.      Pupils: Pupils are equal, round, and reactive to light.   Neck:      Vascular: No carotid bruit.   Cardiovascular:      Rate and Rhythm: Normal rate and regular rhythm.      Heart sounds: Normal heart sounds. No murmur heard.  Pulmonary:      Breath sounds: Normal breath sounds. No wheezing, rhonchi or rales.   Abdominal:      General: Bowel sounds are normal. There is no distension.      Palpations: Abdomen is soft. There is no mass.      Tenderness: There is no abdominal tenderness.   Musculoskeletal:         General: No swelling or deformity.      Cervical back: Neck supple. No tenderness.   Lymphadenopathy:      Cervical: No cervical adenopathy.   Skin:     General: Skin is warm and dry.      Findings: No lesion or rash.   Neurological:      Mental Status: He is alert and oriented to person, place, and time.      Sensory: No sensory deficit.      Motor: No weakness.      Coordination: Coordination normal.      Deep Tendon Reflexes: Reflexes normal.   Psychiatric:         Mood and Affect: Mood normal.         Behavior: Behavior normal.         Judgment: Judgment normal.           Assessment/Plan   Diagnoses and all orders for this visit:  Healthcare maintenance - vaccines current. Labs reviewed and discussed. Colonoscopy current, due in December.  Primary hypertension /Aneurysm of ascending aorta  - well controlled, continue current dose on Losar/HCTZ  CAD in native artery -  asymptomatic, monitor  Chronic prostatitis with hematuria/ Prostate Cancer - stable, monitoring with Urology  Weight - recommend low carb diet, increasing water intake to at least 64oz/day, healthy snacking between meals, and regular cardiovascular exercise 150mins/week. Goal for weight loss is 1-2# per week.     Follow up in 6 months, 15min

## 2024-05-14 RX ORDER — LOSARTAN POTASSIUM AND HYDROCHLOROTHIAZIDE 25; 100 MG/1; MG/1
1 TABLET ORAL DAILY
Qty: 90 TABLET | Refills: 1 | OUTPATIENT
Start: 2024-05-14

## 2024-10-04 ENCOUNTER — APPOINTMENT (OUTPATIENT)
Dept: RADIATION ONCOLOGY | Facility: HOSPITAL | Age: 64
End: 2024-10-04
Payer: COMMERCIAL

## 2024-10-18 ENCOUNTER — LAB (OUTPATIENT)
Dept: LAB | Facility: LAB | Age: 64
End: 2024-10-18
Payer: COMMERCIAL

## 2024-10-18 DIAGNOSIS — C61 ADENOCARCINOMA OF PROSTATE (MULTI): ICD-10-CM

## 2024-10-18 LAB — PSA SERPL-MCNC: 0.27 NG/ML

## 2024-10-18 PROCEDURE — 84153 ASSAY OF PSA TOTAL: CPT

## 2024-10-18 PROCEDURE — 36415 COLL VENOUS BLD VENIPUNCTURE: CPT

## 2024-10-22 ENCOUNTER — TELEPHONE (OUTPATIENT)
Dept: RADIATION ONCOLOGY | Facility: HOSPITAL | Age: 64
End: 2024-10-22
Payer: COMMERCIAL

## 2024-10-24 ASSESSMENT — ENCOUNTER SYMPTOMS
CHEST TIGHTNESS: 0
BACK PAIN: 0
DYSURIA: 0
DIFFICULTY URINATING: 0
COUGH: 0
ANAL BLEEDING: 0
ADENOPATHY: 0
RECTAL PAIN: 0
PSYCHIATRIC NEGATIVE: 1
ABDOMINAL PAIN: 0
FREQUENCY: 0
WEAKNESS: 0
BLOOD IN STOOL: 0
FEVER: 0
JOINT SWELLING: 0
SHORTNESS OF BREATH: 0
FATIGUE: 0
UNEXPECTED WEIGHT CHANGE: 0
ALLERGIC/IMMUNOLOGIC NEGATIVE: 1
DIZZINESS: 0
PALPITATIONS: 0
DIARRHEA: 0
ARTHRALGIAS: 0
CONSTIPATION: 0
HEMATURIA: 0

## 2024-10-24 NOTE — PROGRESS NOTES
Cancer synopsis:  Rad/onc: Weisent CNP    64 year old man with intermediate risk prostate cancer, [id number], Joshua 3+4=7 with 6/12 involved cores, pretreatment PSA 5 ng/mL. After discussing the various treatment options, the patient  opted for prostate seed implant brachytherapy.      Technical Summary:   Region(s) Treated: Prostate   Radiation Dose Prescribed: 144 Gy, minimal peripheral dose.   Radiation Technique/Machine Used: LDR prostate brachytherapy implant using 13 needles and 41 I-125 seeds. Additional radiation technical details available in our radiation oncology EMR [employer] and our treatment planning system. Radiation Treatment Date: 8/10/2020     History of presenting illness:    Patient ID: [id number]     Eliud Mckeon is a 64 y.o. male who presents for FIR prostate cancer GG2 IPSA 5 [id number] now s/p PSI w/put ADT.    RT Site: prostate  RT Date: 08/2020  Hormone therapy: No  Hot Flushes: Denies  Fatigue: Denies  Bone pain: Denies  ED: Patient does report some sexual dysfunction but believes to be age related and unconcerned at this time   - Quality of erections during the last 4 weeks: 3 = Firm enough for masturbation and foreplay only  - Use of erectile dysfunction medications:  None  IPSS: virtual  Urinary symptoms: Reports frequency but manageable. Did try Cialis in the past with SE of light headedness that resulted in patient stopping medication. Denies dysuria, heamturia.  Urinary Medications: Yes, flowmax daily  Rectal bleeding: Denies  Colonoscopy: 12/2019, diverticula noted and non-bleeding internal heemoroids, next in 5yrs  Other systems: Denies    Review of systems:  Review of Systems   Constitutional:  Negative for fatigue, fever and unexpected weight change.   Respiratory:  Negative for cough, chest tightness and shortness of breath.    Cardiovascular:  Negative for chest pain, palpitations and leg swelling.   Gastrointestinal:  Negative for abdominal pain, anal bleeding, blood in stool,  constipation, diarrhea and rectal pain.   Endocrine: Negative for cold intolerance, heat intolerance and polyuria.   Genitourinary:  Negative for decreased urine volume, difficulty urinating, dysuria, frequency, hematuria and urgency.   Musculoskeletal:  Negative for arthralgias, back pain, gait problem and joint swelling.   Skin: Negative.    Allergic/Immunologic: Negative.    Neurological:  Negative for dizziness, syncope and weakness.   Hematological:  Negative for adenopathy.   Psychiatric/Behavioral: Negative.       Past Medical history  Past Medical History:   Diagnosis Date    Abdominal pain 05/12/2023    Alcohol abuse, daily use 05/12/2023    Blepharospasm 08/31/2018    Blepharospasm    Elevated liver enzymes 05/12/2023    Elevated PSA 05/12/2023    Impaired fasting glucose 12/02/2020    IFG (impaired fasting glucose)    Other urticaria 06/16/2017    Urticaria, acute    Personal history of other diseases of male genital organs     History of prostatitis    Personal history of other diseases of urinary system 01/19/2021    History of renal insufficiency syndrome    Personal history of other drug therapy 09/24/2015    History of influenza vaccination    Personal history of other infectious and parasitic diseases 08/19/2016    History of scabies    Personal history of other infectious and parasitic diseases 08/07/2014    History of condyloma acuminatum    Personal history of other specified conditions 04/15/2016    History of urinary urgency    Personal history of other specified conditions 02/22/2016    History of fatigue    Personal history of other specified conditions 11/17/2014    History of dysuria    Personal history of other specified conditions 04/15/2016    History of urinary frequency    Personal history of other specified conditions 09/24/2015    History of abdominal pain    Rash and other nonspecific skin eruption 10/13/2017    Skin rash      Surgical/family history  Family History   Problem  Relation Name Age of Onset    Pancreatic cancer Mother      Kidney disease Father        Past Surgical History:   Procedure Laterality Date    OTHER SURGICAL HISTORY  01/19/2021    Prostate brachytherapy      Social History  Tobacco Use: Low Risk  (5/13/2024)    Patient History     Smoking Tobacco Use: Never     Smokeless Tobacco Use: Never     Passive Exposure: Not on file       Current med list:  Current Outpatient Medications   Medication Instructions    atorvastatin (Lipitor) 20 mg tablet TAKE ONE-HALF (1/2) TABLET DAILY    losartan-hydrochlorothiazide (Hyzaar) 100-25 mg tablet 1 tablet, oral, Daily    tamsulosin (FLOMAX) 0.4 mg, oral, Daily      Last recorded vital:  virtual    Physical exam  virtual    Pertinent labs:  Prostate Specific AG   Date/Time Value Ref Range Status   10/18/2024 09:10 AM 0.27 <=4.00 ng/mL Final     Dx:  Problem List Items Addressed This Visit    None  Visit Diagnoses       Malignant neoplasm of prostate (Multi)    -  Primary    Relevant Orders    Clinic Appointment Request Follow Up; NIRANJAN RAMOS (virtual); [facility] [facility] (virtual) (Completed)         PSA of 0.27 was reviewed and is stable. Review of latent SE including rectal bleeding, hematuria, urinary strictures, ED where reviewed as well as how to contact office if s/s present. Denies latent SE. NCCN guidelines where reviewed and routine FUV of every 3m for first year and every 6m for four years for a total of five years was discussed. Patient verbalized understanding.      PLAN:  FUV 6m  Labs PSA in 6m  Imaging none  Flowmax daily  FUV other providers: PCP for routine evals    Please contact office with any concerns:  Niranjan Christina CNP  [phone]    I performed this visit using realtime telehealth tools, including an audio/video OR telephone connection between patient’s name and location Eliud Mckeon and Niranjan Ramos CNP.    1. POS 02: Telehealth provided other than in patient's home.  o Patient is not located  in their home when receiving health services or health  related services through telecommunication technology.

## 2024-10-25 ENCOUNTER — HOSPITAL ENCOUNTER (OUTPATIENT)
Dept: RADIATION ONCOLOGY | Facility: HOSPITAL | Age: 64
Setting detail: RADIATION/ONCOLOGY SERIES
Discharge: HOME | End: 2024-10-25
Payer: COMMERCIAL

## 2024-10-25 DIAGNOSIS — N41.1 CHRONIC PROSTATITIS WITH HEMATURIA: ICD-10-CM

## 2024-10-25 DIAGNOSIS — R31.9 CHRONIC PROSTATITIS WITH HEMATURIA: ICD-10-CM

## 2024-10-25 DIAGNOSIS — C61 MALIGNANT NEOPLASM OF PROSTATE (MULTI): Primary | ICD-10-CM

## 2024-10-25 PROCEDURE — 99212 OFFICE O/P EST SF 10 MIN: CPT

## 2024-10-25 PROCEDURE — 99212 OFFICE O/P EST SF 10 MIN: CPT | Mod: 95

## 2024-10-25 RX ORDER — TAMSULOSIN HYDROCHLORIDE 0.4 MG/1
0.4 CAPSULE ORAL DAILY
Qty: 30 CAPSULE | Refills: 5 | Status: SHIPPED | OUTPATIENT
Start: 2024-10-25 | End: 2025-04-23

## 2024-11-15 ENCOUNTER — APPOINTMENT (OUTPATIENT)
Dept: PRIMARY CARE | Facility: CLINIC | Age: 64
End: 2024-11-15
Payer: COMMERCIAL

## 2024-11-15 ENCOUNTER — LAB (OUTPATIENT)
Dept: LAB | Facility: LAB | Age: 64
End: 2024-11-15
Payer: COMMERCIAL

## 2024-11-15 VITALS
SYSTOLIC BLOOD PRESSURE: 114 MMHG | HEIGHT: 69 IN | HEART RATE: 72 BPM | WEIGHT: 201 LBS | DIASTOLIC BLOOD PRESSURE: 76 MMHG | OXYGEN SATURATION: 97 % | BODY MASS INDEX: 29.77 KG/M2 | RESPIRATION RATE: 12 BRPM

## 2024-11-15 DIAGNOSIS — I71.21 ANEURYSM OF ASCENDING AORTA WITHOUT RUPTURE (CMS-HCC): Primary | ICD-10-CM

## 2024-11-15 DIAGNOSIS — R21 RASH: ICD-10-CM

## 2024-11-15 DIAGNOSIS — Z92.3 HX OF BRACHYTHERAPY: ICD-10-CM

## 2024-11-15 DIAGNOSIS — I10 PRIMARY HYPERTENSION: ICD-10-CM

## 2024-11-15 DIAGNOSIS — C61 ADENOCARCINOMA OF PROSTATE (MULTI): ICD-10-CM

## 2024-11-15 DIAGNOSIS — I25.10 CAD IN NATIVE ARTERY: ICD-10-CM

## 2024-11-15 PROBLEM — E66.9 OBESITY: Status: RESOLVED | Noted: 2023-05-12 | Resolved: 2024-11-15

## 2024-11-15 PROBLEM — R07.89 CHEST TIGHTNESS: Status: RESOLVED | Noted: 2023-11-06 | Resolved: 2024-11-15

## 2024-11-15 LAB
ALBUMIN SERPL BCP-MCNC: 5 G/DL (ref 3.4–5)
ALP SERPL-CCNC: 52 U/L (ref 33–136)
ALT SERPL W P-5'-P-CCNC: 39 U/L (ref 10–52)
ANION GAP SERPL CALC-SCNC: 12 MMOL/L (ref 10–20)
AST SERPL W P-5'-P-CCNC: 33 U/L (ref 9–39)
BILIRUB SERPL-MCNC: 0.7 MG/DL (ref 0–1.2)
BUN SERPL-MCNC: 28 MG/DL (ref 6–23)
CALCIUM SERPL-MCNC: 10.1 MG/DL (ref 8.6–10.6)
CHLORIDE SERPL-SCNC: 98 MMOL/L (ref 98–107)
CHOLEST SERPL-MCNC: 163 MG/DL (ref 0–199)
CHOLESTEROL/HDL RATIO: 2.1
CO2 SERPL-SCNC: 29 MMOL/L (ref 21–32)
CREAT SERPL-MCNC: 1.44 MG/DL (ref 0.5–1.3)
EGFRCR SERPLBLD CKD-EPI 2021: 54 ML/MIN/1.73M*2
GLUCOSE SERPL-MCNC: 93 MG/DL (ref 74–99)
HDLC SERPL-MCNC: 76.2 MG/DL
LDLC SERPL CALC-MCNC: 76 MG/DL
NON HDL CHOLESTEROL: 87 MG/DL (ref 0–149)
POTASSIUM SERPL-SCNC: 4.4 MMOL/L (ref 3.5–5.3)
PROT SERPL-MCNC: 7.4 G/DL (ref 6.4–8.2)
PSA SERPL-MCNC: 0.2 NG/ML
SODIUM SERPL-SCNC: 135 MMOL/L (ref 136–145)
TRIGL SERPL-MCNC: 55 MG/DL (ref 0–149)
VLDL: 11 MG/DL (ref 0–40)

## 2024-11-15 PROCEDURE — 3078F DIAST BP <80 MM HG: CPT | Performed by: FAMILY MEDICINE

## 2024-11-15 PROCEDURE — 1036F TOBACCO NON-USER: CPT | Performed by: FAMILY MEDICINE

## 2024-11-15 PROCEDURE — 80061 LIPID PANEL: CPT

## 2024-11-15 PROCEDURE — 80053 COMPREHEN METABOLIC PANEL: CPT

## 2024-11-15 PROCEDURE — 84153 ASSAY OF PSA TOTAL: CPT

## 2024-11-15 PROCEDURE — 99214 OFFICE O/P EST MOD 30 MIN: CPT | Performed by: FAMILY MEDICINE

## 2024-11-15 PROCEDURE — 3008F BODY MASS INDEX DOCD: CPT | Performed by: FAMILY MEDICINE

## 2024-11-15 PROCEDURE — 3074F SYST BP LT 130 MM HG: CPT | Performed by: FAMILY MEDICINE

## 2024-11-15 PROCEDURE — 36415 COLL VENOUS BLD VENIPUNCTURE: CPT

## 2024-11-15 RX ORDER — ATORVASTATIN CALCIUM 20 MG/1
TABLET, FILM COATED ORAL
Qty: 90 TABLET | Refills: 1 | Status: SHIPPED | OUTPATIENT
Start: 2024-11-15

## 2024-11-15 RX ORDER — LOSARTAN POTASSIUM AND HYDROCHLOROTHIAZIDE 25; 100 MG/1; MG/1
1 TABLET ORAL DAILY
Qty: 90 TABLET | Refills: 1 | Status: SHIPPED | OUTPATIENT
Start: 2024-11-15

## 2024-11-15 RX ORDER — CLOTRIMAZOLE AND BETAMETHASONE DIPROPIONATE 10; .64 MG/G; MG/G
1 CREAM TOPICAL 2 TIMES DAILY
Qty: 30 G | Refills: 1 | Status: SHIPPED | OUTPATIENT
Start: 2024-11-15 | End: 2025-01-14

## 2024-11-15 ASSESSMENT — ENCOUNTER SYMPTOMS
CHILLS: 0
BACK PAIN: 0
CONSTIPATION: 0
ABDOMINAL PAIN: 0
ARTHRALGIAS: 0
NERVOUS/ANXIOUS: 0
DYSURIA: 0
DIARRHEA: 0
ADENOPATHY: 0
BLOOD IN STOOL: 0
CHEST TIGHTNESS: 0
FATIGUE: 0
COUGH: 0
DYSPHORIC MOOD: 0
SORE THROAT: 0
DIFFICULTY URINATING: 0
BRUISES/BLEEDS EASILY: 0
APPETITE CHANGE: 0
SHORTNESS OF BREATH: 0
HEADACHES: 0
EYE PAIN: 0
WEAKNESS: 0
EYE REDNESS: 0
DIZZINESS: 0
FEVER: 0
ABDOMINAL DISTENTION: 0

## 2024-11-15 NOTE — PROGRESS NOTES
"Subjective   Patient ID: Eliud Mckeon is a 64 y.o. male who presents for Follow-up.  Pt has chronic Ascending aortic aneurysm, CAD and HTN.  Pt is taking Losart/HCTZ. Tolerating well.  Exercising 3 days per week   Low sodium diet is  being followed.   Is not monitoring home blood pressures.   Denies HA, vision changes or CP.     Pt has Dyslipidemia.   Lipid panel showed LDL 82 in April.  Currently taking Atorvastatin and is tolerating well without muscle pains or weakness.     BPH/ elevated PSA s/p brachytherapy is well controlled and monitoring with PSA .  Pt has 1 x nightly nocturia. Urinary stream is good and he denies difficulty starting urination or emptying bladder.     He has pink itchy patchy rash on bilateral forearms for about 1 month. Also has rash R index finger with cracking and chronic jock itch that comes and goes, usually well controlled with Cortizone cream as needed but is not working lately.        Review of Systems   Constitutional:  Negative for appetite change, chills, fatigue and fever.   HENT:  Negative for congestion, hearing loss and sore throat.    Eyes:  Negative for pain, redness and visual disturbance.   Respiratory:  Negative for cough, chest tightness and shortness of breath.    Cardiovascular:  Negative for chest pain and leg swelling.   Gastrointestinal:  Negative for abdominal distention, abdominal pain, blood in stool, constipation and diarrhea.   Genitourinary:  Negative for difficulty urinating and dysuria.   Musculoskeletal:  Negative for arthralgias and back pain.   Skin:  Positive for rash.   Neurological:  Negative for dizziness, weakness and headaches.   Hematological:  Negative for adenopathy. Does not bruise/bleed easily.   Psychiatric/Behavioral:  Negative for dysphoric mood. The patient is not nervous/anxious.        Objective   /76   Pulse 72   Resp 12   Ht 1.753 m (5' 9\")   Wt 91.2 kg (201 lb)   SpO2 97%   BMI 29.68 kg/m²    Physical " Exam  Constitutional:       General: He is not in acute distress.     Appearance: Normal appearance.   Cardiovascular:      Rate and Rhythm: Normal rate and regular rhythm.      Heart sounds: Normal heart sounds. No murmur heard.  Pulmonary:      Effort: Pulmonary effort is normal.      Breath sounds: Normal breath sounds.   Abdominal:      Palpations: Abdomen is soft.      Tenderness: There is no abdominal tenderness.   Skin:     Findings: Rash (B/L forearms have eythematous patchy rash, areas of plaque, and excoriation. R index finger has ecazematous apprearing rash with crackling. B/L groin has erythematous macular patchy rash.) present.   Neurological:      Mental Status: He is alert.   Psychiatric:         Mood and Affect: Mood normal.         Judgment: Judgment normal.           Assessment/Plan   Diagnoses and all orders for this visit:  Aneurysm of ascending aorta without rupture - asymptomatic, stable on Echo last year. Will monitor with Echo next year.  CAD in native artery - asymptomatic, doing well on statin, monitor  Primary hypertension - well controlled, continue current dose on Losart/hydrochlorothiazide  Adenocarcinoma of prostate/Hx of brachytherapy - stable, PSA in good range, monitoring with Urology  Rash - giving antifungal/steroid cream for jock itch rash. Recommend follow up with dermatology for rash on arms/hands.    Follow up here in 6 months, 30min for physical

## 2024-11-15 NOTE — PROGRESS NOTES
"Subjective   Patient ID: Eliud Mckeon is a 64 y.o. male who presents for Follow-up.    HPI     Review of Systems    Objective   /76   Pulse 72   Resp 12   Ht 1.753 m (5' 9\")   Wt 91.2 kg (201 lb)   SpO2 97%   BMI 29.68 kg/m²     Physical Exam    Assessment/Plan          "

## 2024-11-19 RX ORDER — LOSARTAN POTASSIUM AND HYDROCHLOROTHIAZIDE 25; 100 MG/1; MG/1
1 TABLET ORAL DAILY
Qty: 90 TABLET | Refills: 1 | OUTPATIENT
Start: 2024-11-19

## 2025-04-21 ENCOUNTER — LAB (OUTPATIENT)
Dept: LAB | Facility: HOSPITAL | Age: 65
End: 2025-04-21
Payer: COMMERCIAL

## 2025-04-21 DIAGNOSIS — C61 MALIGNANT NEOPLASM OF PROSTATE (MULTI): Primary | ICD-10-CM

## 2025-04-21 PROCEDURE — 84153 ASSAY OF PSA TOTAL: CPT

## 2025-04-22 LAB — PSA SERPL-MCNC: 0.24 NG/ML

## 2025-04-24 ENCOUNTER — TELEPHONE (OUTPATIENT)
Dept: RADIATION ONCOLOGY | Facility: HOSPITAL | Age: 65
End: 2025-04-24
Payer: COMMERCIAL

## 2025-04-24 ASSESSMENT — ENCOUNTER SYMPTOMS
CONSTIPATION: 0
BLOOD IN STOOL: 0
ANAL BLEEDING: 0
ADENOPATHY: 0
JOINT SWELLING: 0
PSYCHIATRIC NEGATIVE: 1
ABDOMINAL PAIN: 0
CHEST TIGHTNESS: 0
DYSURIA: 0
DIZZINESS: 0
WEAKNESS: 0
BACK PAIN: 0
RECTAL PAIN: 0
FREQUENCY: 0
SHORTNESS OF BREATH: 0
ALLERGIC/IMMUNOLOGIC NEGATIVE: 1
DIARRHEA: 0
FATIGUE: 0
DIFFICULTY URINATING: 0
HEMATURIA: 0
PALPITATIONS: 0
COUGH: 0
FEVER: 0
ARTHRALGIAS: 0
UNEXPECTED WEIGHT CHANGE: 0

## 2025-04-24 NOTE — TELEPHONE ENCOUNTER
Called pt. to remind of appointment on 4/25/2025 at  3:30 pm with RAÚL Mckeon. Pt's phone went to voicemail left number if needs to reschedule.

## 2025-04-24 NOTE — PROGRESS NOTES
Cancer synopsis:  Rad/onc: Linda DEL TORO    64 year old man with intermediate risk prostate cancer, [id number], Joshua 3+4=7 with 6/12 involved cores, pretreatment PSA 5 ng/mL. After discussing the various treatment options, the patient  opted for prostate seed implant brachytherapy.      Technical Summary:   Region(s) Treated: Prostate   Radiation Dose Prescribed: 144 Gy, minimal peripheral dose.   Radiation Technique/Machine Used: LDR prostate brachytherapy implant using 13 needles and 41 I-125 seeds. Additional radiation technical details available in our radiation oncology EMR [employer] and our treatment planning system. Radiation Treatment Date: 8/10/2020     History of presenting illness:    Patient ID: [id number]     Eliud Mckeon is a 64 y.o. male who presents for FIR prostate cancer GG2 IPSA 5 [id number] now s/p PSI w/put ADT.    RT Site: prostate  RT Date: 08/2020  Hormone therapy: No  Hot Flushes: Denies  Fatigue: Denies  Bone pain: Denies  ED: Patient does report some sexual dysfunction but believes to be age related and unconcerned at this time   - Quality of erections during the last 4 weeks: 3 = Firm enough for masturbation and foreplay only  - Use of erectile dysfunction medications:  None  IPSS: virtual  Urinary symptoms: Reports frequency but manageable. Did try Cialis in the past with SE of light headedness that resulted in patient stopping medication. Denies dysuria, heamturia. Nocturia 2 times a night.  Urinary Medications: Yes, flowmax daily  Rectal bleeding: Denies  Colonoscopy: 12/2019, diverticula noted and non-bleeding internal heemoroids, next in 5yrs  Other systems: Denies    Review of systems:  Review of Systems   Constitutional:  Negative for fatigue, fever and unexpected weight change.   Respiratory:  Negative for cough, chest tightness and shortness of breath.    Cardiovascular:  Negative for chest pain, palpitations and leg swelling.   Gastrointestinal:  Negative for abdominal pain, anal  bleeding, blood in stool, constipation, diarrhea and rectal pain.   Endocrine: Negative for cold intolerance, heat intolerance and polyuria.   Genitourinary:  Negative for decreased urine volume, difficulty urinating, dysuria, frequency, hematuria and urgency.   Musculoskeletal:  Negative for arthralgias, back pain, gait problem and joint swelling.   Skin: Negative.    Allergic/Immunologic: Negative.    Neurological:  Negative for dizziness, syncope and weakness.   Hematological:  Negative for adenopathy.   Psychiatric/Behavioral: Negative.       Past Medical history  Past Medical History:   Diagnosis Date    Abdominal pain 05/12/2023    Alcohol abuse, daily use 05/12/2023    Blepharospasm 08/31/2018    Blepharospasm    Chest tightness 11/06/2023    Elevated liver enzymes 05/12/2023    Elevated PSA 05/12/2023    Impaired fasting glucose 12/02/2020    IFG (impaired fasting glucose)    Obesity 05/12/2023    Other urticaria 06/16/2017    Urticaria, acute    Personal history of other diseases of male genital organs     History of prostatitis    Personal history of other diseases of urinary system 01/19/2021    History of renal insufficiency syndrome    Personal history of other drug therapy 09/24/2015    History of influenza vaccination    Personal history of other infectious and parasitic diseases 08/19/2016    History of scabies    Personal history of other infectious and parasitic diseases 08/07/2014    History of condyloma acuminatum    Personal history of other specified conditions 04/15/2016    History of urinary urgency    Personal history of other specified conditions 02/22/2016    History of fatigue    Personal history of other specified conditions 11/17/2014    History of dysuria    Personal history of other specified conditions 04/15/2016    History of urinary frequency    Personal history of other specified conditions 09/24/2015    History of abdominal pain    Rash and other nonspecific skin eruption  10/13/2017    Skin rash      Surgical/family history  Family History   Problem Relation Name Age of Onset    Pancreatic cancer Mother      Kidney disease Father        Past Surgical History:   Procedure Laterality Date    OTHER SURGICAL HISTORY  01/19/2021    Prostate brachytherapy      Social History  Tobacco Use: Low Risk  (11/15/2024)    Patient History     Smoking Tobacco Use: Never     Smokeless Tobacco Use: Never     Passive Exposure: Not on file       Current med list:  Current Outpatient Medications   Medication Instructions    atorvastatin (Lipitor) 20 mg tablet TAKE ONE-HALF (1/2) TABLET DAILY    losartan-hydrochlorothiazide (Hyzaar) 100-25 mg tablet 1 tablet, oral, Daily      Last recorded vital:  virtual    Physical exam  virtual    Pertinent labs:  Prostate Specific AG   Date/Time Value Ref Range Status   04/21/2025 08:46 AM 0.24 <=4.00 ng/mL Final     Dx:  Problem List Items Addressed This Visit    None  Visit Diagnoses         Malignant neoplasm of prostate (Multi)    -  Primary    Relevant Orders    Prostate Specific Antigen    Clinic Appointment Request Follow Up; JACKLYN RAMOS (virtual); [facility] [facility] (virtual)    Clinic Appointment Request Follow Up; JACKLYN RAMOS (virtual); [facility] (virtual) (Completed)          Prostate ca:  PSA of 0.24 was reviewed and is stable. Review of latent SE including rectal bleeding, hematuria, urinary strictures, ED where reviewed as well as how to contact office if s/s present. Denies latent SE. NCCN guidelines where reviewed and routine FUV of every 3m for first year and every 6m for four years for a total of five years was discussed. Patient verbalized understanding.     BPH:  Will continue flowmax daily- refills sent     Colon ca screening:  Due for next now     PLAN:  FUV 6m  Labs PSA in 6m  Imaging none  Flowmax daily  Screening: Colon ca screening due  FUV other providers: PCP for routine evals    Please contact office with any  concerns:  Niranjan Christina CNP  [phone]    I performed this visit using realtime telehealth tools, including an audio/video OR telephone connection between patient’s name and location Eliud Mckeon and Niranjan Mckeon CNP.    1. POS 02: Telehealth provided other than in patient's home.  o Patient is not located in their home when receiving health services or health  related services through telecommunication technology.

## 2025-04-25 ENCOUNTER — HOSPITAL ENCOUNTER (OUTPATIENT)
Dept: RADIATION ONCOLOGY | Facility: HOSPITAL | Age: 65
Setting detail: RADIATION/ONCOLOGY SERIES
Discharge: HOME | End: 2025-04-25
Payer: COMMERCIAL

## 2025-04-25 DIAGNOSIS — C61 MALIGNANT NEOPLASM OF PROSTATE (MULTI): Primary | ICD-10-CM

## 2025-04-25 DIAGNOSIS — R31.9 CHRONIC PROSTATITIS WITH HEMATURIA: ICD-10-CM

## 2025-04-25 DIAGNOSIS — N41.1 CHRONIC PROSTATITIS WITH HEMATURIA: ICD-10-CM

## 2025-04-25 PROCEDURE — 99213 OFFICE O/P EST LOW 20 MIN: CPT | Mod: 95

## 2025-04-25 PROCEDURE — 99213 OFFICE O/P EST LOW 20 MIN: CPT

## 2025-04-25 RX ORDER — TAMSULOSIN HYDROCHLORIDE 0.4 MG/1
0.4 CAPSULE ORAL DAILY
Qty: 30 CAPSULE | Refills: 5 | Status: SHIPPED | OUTPATIENT
Start: 2025-04-25

## 2025-05-11 DIAGNOSIS — I10 PRIMARY HYPERTENSION: ICD-10-CM

## 2025-05-11 DIAGNOSIS — R80.9 PROTEINURIA, UNSPECIFIED TYPE: Primary | ICD-10-CM

## 2025-05-14 ENCOUNTER — APPOINTMENT (OUTPATIENT)
Dept: PRIMARY CARE | Facility: CLINIC | Age: 65
End: 2025-05-14
Payer: COMMERCIAL

## 2025-05-14 VITALS
OXYGEN SATURATION: 98 % | HEART RATE: 68 BPM | WEIGHT: 203 LBS | RESPIRATION RATE: 12 BRPM | BODY MASS INDEX: 30.07 KG/M2 | SYSTOLIC BLOOD PRESSURE: 118 MMHG | HEIGHT: 69 IN | DIASTOLIC BLOOD PRESSURE: 76 MMHG

## 2025-05-14 DIAGNOSIS — C61 ADENOCARCINOMA OF PROSTATE (MULTI): ICD-10-CM

## 2025-05-14 DIAGNOSIS — N18.31 CHRONIC RENAL IMPAIRMENT, STAGE 3A (MULTI): ICD-10-CM

## 2025-05-14 DIAGNOSIS — Z92.3 HX OF BRACHYTHERAPY: ICD-10-CM

## 2025-05-14 DIAGNOSIS — I10 PRIMARY HYPERTENSION: ICD-10-CM

## 2025-05-14 DIAGNOSIS — Z00.00 HEALTHCARE MAINTENANCE: Primary | ICD-10-CM

## 2025-05-14 DIAGNOSIS — I71.21 ANEURYSM OF ASCENDING AORTA WITHOUT RUPTURE: ICD-10-CM

## 2025-05-14 DIAGNOSIS — I25.10 CAD IN NATIVE ARTERY: ICD-10-CM

## 2025-05-14 PROCEDURE — 99396 PREV VISIT EST AGE 40-64: CPT | Performed by: FAMILY MEDICINE

## 2025-05-14 PROCEDURE — 1036F TOBACCO NON-USER: CPT | Performed by: FAMILY MEDICINE

## 2025-05-14 PROCEDURE — 3008F BODY MASS INDEX DOCD: CPT | Performed by: FAMILY MEDICINE

## 2025-05-14 PROCEDURE — 99214 OFFICE O/P EST MOD 30 MIN: CPT | Performed by: FAMILY MEDICINE

## 2025-05-14 PROCEDURE — 3074F SYST BP LT 130 MM HG: CPT | Performed by: FAMILY MEDICINE

## 2025-05-14 PROCEDURE — 3078F DIAST BP <80 MM HG: CPT | Performed by: FAMILY MEDICINE

## 2025-05-14 RX ORDER — TRIAMCINOLONE ACETONIDE 1 MG/G
CREAM TOPICAL
COMMUNITY
Start: 2025-01-28 | End: 2025-05-14 | Stop reason: ALTCHOICE

## 2025-05-14 RX ORDER — LOSARTAN POTASSIUM AND HYDROCHLOROTHIAZIDE 25; 100 MG/1; MG/1
1 TABLET ORAL DAILY
Qty: 90 TABLET | Refills: 1 | Status: SHIPPED | OUTPATIENT
Start: 2025-05-14

## 2025-05-14 RX ORDER — ATORVASTATIN CALCIUM 20 MG/1
TABLET, FILM COATED ORAL
Qty: 90 TABLET | Refills: 1 | Status: SHIPPED | OUTPATIENT
Start: 2025-05-14

## 2025-05-14 RX ORDER — CLOBETASOL PROPIONATE 0.5 MG/G
CREAM TOPICAL
COMMUNITY
Start: 2025-03-31

## 2025-05-14 ASSESSMENT — PATIENT HEALTH QUESTIONNAIRE - PHQ9
3. TROUBLE FALLING OR STAYING ASLEEP OR SLEEPING TOO MUCH: NOT AT ALL
1. LITTLE INTEREST OR PLEASURE IN DOING THINGS: NOT AT ALL
9. THOUGHTS THAT YOU WOULD BE BETTER OFF DEAD, OR OF HURTING YOURSELF: NOT AT ALL
7. TROUBLE CONCENTRATING ON THINGS, SUCH AS READING THE NEWSPAPER OR WATCHING TELEVISION: NOT AT ALL
6. FEELING BAD ABOUT YOURSELF - OR THAT YOU ARE A FAILURE OR HAVE LET YOURSELF OR YOUR FAMILY DOWN: NOT AT ALL
SUM OF ALL RESPONSES TO PHQ QUESTIONS 1-9: 0
10. IF YOU CHECKED OFF ANY PROBLEMS, HOW DIFFICULT HAVE THESE PROBLEMS MADE IT FOR YOU TO DO YOUR WORK, TAKE CARE OF THINGS AT HOME, OR GET ALONG WITH OTHER PEOPLE: NOT DIFFICULT AT ALL
SUM OF ALL RESPONSES TO PHQ9 QUESTIONS 1 AND 2: 0
2. FEELING DOWN, DEPRESSED OR HOPELESS: NOT AT ALL
5. POOR APPETITE OR OVEREATING: NOT AT ALL
4. FEELING TIRED OR HAVING LITTLE ENERGY: NOT AT ALL
8. MOVING OR SPEAKING SO SLOWLY THAT OTHER PEOPLE COULD HAVE NOTICED. OR THE OPPOSITE, BEING SO FIGETY OR RESTLESS THAT YOU HAVE BEEN MOVING AROUND A LOT MORE THAN USUAL: NOT AT ALL

## 2025-05-14 ASSESSMENT — ENCOUNTER SYMPTOMS
BACK PAIN: 0
APPETITE CHANGE: 0
DIFFICULTY URINATING: 0
DYSPHORIC MOOD: 0
HEADACHES: 0
CONSTIPATION: 0
WEAKNESS: 0
FEVER: 0
ARTHRALGIAS: 0
FATIGUE: 0
DIZZINESS: 0
ABDOMINAL PAIN: 0
CHEST TIGHTNESS: 0
SHORTNESS OF BREATH: 0
SORE THROAT: 0
BLOOD IN STOOL: 0
CHILLS: 0
NERVOUS/ANXIOUS: 0
BRUISES/BLEEDS EASILY: 0
EYE REDNESS: 0
ADENOPATHY: 0
DIARRHEA: 0
EYE PAIN: 0
COUGH: 0
DYSURIA: 0
ABDOMINAL DISTENTION: 0

## 2025-05-14 NOTE — PROGRESS NOTES
"Subjective   Patient ID: Eliud Mckeon is a 64 y.o. male who presents for Annual Exam.    HPI     Review of Systems    Objective   /76   Pulse 68   Resp 12   Ht 1.753 m (5' 9\")   Wt 92.1 kg (203 lb)   SpO2 98%   BMI 29.98 kg/m²     Physical Exam    Assessment/Plan          "

## 2025-05-14 NOTE — PROGRESS NOTES
Subjective   Patient ID: Eliud Mckeon is a 64 y.o. male who presents for Annual Exam.  PMHX, PSHx, Fam hx, and Social hx reviewed.   New concerns none  Vaccines Pneumonia  Dentist seen at least yearly yes  Vision concerns none  Hearing concerns none  Diet is overall healthy.   Smoker - no  Lung CT/screening - NA  AAA screening - NA  Alcohol use - averages 2drinks per night  Exercising 2-3 days per week.   Sexually active - yes, no issues  Colonoscopy 2019, may be due, to see Dr Murray    Pt has chronic ascending aortic aneurysm, CAD, and HTN.  Pt is taking Losart/HCTZ. Tolerating well.  Exercising 2-3 days per week   Low sodium diet is not  being followed.   Is monitoring home blood pressures. Readings range 120s/70s.  Denies HA, vision changes or CP.     Pt has Dyslipidemia.   Lipid panel showed LDL 76.  Currently taking Atorvastatin and is tolerating well without muscle pains or weakness.     Pt has stable CRI.  GFR 54. BP is controlled and pt is not following low sodium diet.     BPH/ prostate cancer post radiation tx is well controlled with Tamsulosin and monitoring PSA with Urology .  . Urinary stream is fair and he denies difficulty starting urination or emptying bladder.            Review of Systems   Constitutional:  Negative for appetite change, chills, fatigue and fever.   HENT:  Negative for congestion, hearing loss and sore throat.    Eyes:  Negative for pain, redness and visual disturbance.   Respiratory:  Negative for cough, chest tightness and shortness of breath.    Cardiovascular:  Negative for chest pain and leg swelling.   Gastrointestinal:  Negative for abdominal distention, abdominal pain, blood in stool, constipation and diarrhea.   Genitourinary:  Negative for difficulty urinating and dysuria.   Musculoskeletal:  Negative for arthralgias and back pain.   Skin:  Negative for rash.   Neurological:  Negative for dizziness, weakness and headaches.   Hematological:  Negative for adenopathy. Does  "not bruise/bleed easily.   Psychiatric/Behavioral:  Negative for dysphoric mood. The patient is not nervous/anxious.        Objective   /76   Pulse 68   Resp 12   Ht 1.753 m (5' 9\")   Wt 92.1 kg (203 lb)   SpO2 98%   BMI 29.98 kg/m²    Physical Exam  Constitutional:       General: He is not in acute distress.     Appearance: Normal appearance. He is not ill-appearing.   HENT:      Head: Normocephalic and atraumatic.      Right Ear: Tympanic membrane, ear canal and external ear normal.      Left Ear: Tympanic membrane, ear canal and external ear normal.      Nose: Nose normal.      Mouth/Throat:      Mouth: Mucous membranes are moist.      Pharynx: No oropharyngeal exudate or posterior oropharyngeal erythema.   Eyes:      Extraocular Movements: Extraocular movements intact.      Conjunctiva/sclera: Conjunctivae normal.      Pupils: Pupils are equal, round, and reactive to light.   Neck:      Thyroid: No thyroid mass or thyromegaly.      Vascular: No carotid bruit.   Cardiovascular:      Rate and Rhythm: Normal rate and regular rhythm.      Heart sounds: Normal heart sounds. No murmur heard.  Pulmonary:      Effort: Pulmonary effort is normal.      Breath sounds: Normal breath sounds. No wheezing, rhonchi or rales.   Abdominal:      General: Bowel sounds are normal. There is no distension.      Palpations: Abdomen is soft. There is no mass.      Tenderness: There is no abdominal tenderness.   Musculoskeletal:         General: No swelling or deformity.      Cervical back: Neck supple. No tenderness.   Lymphadenopathy:      Cervical: No cervical adenopathy.   Skin:     General: Skin is warm and dry.      Findings: No lesion or rash.   Neurological:      Mental Status: He is alert and oriented to person, place, and time.      Sensory: No sensory deficit.      Motor: No weakness.      Coordination: Coordination normal.      Deep Tendon Reflexes: Reflexes normal.   Psychiatric:         Mood and Affect: Mood " normal.         Behavior: Behavior normal.         Judgment: Judgment normal.           Assessment/Plan   Diagnoses and all orders for this visit:  Healthcare maintenance - Prevnar shot recommended. Rechecking fasting labs today. Colonoscopy being considered, to see Dr Murray.  CAD in native artery - asymptomatic, CAC score due to recheck next year. Doing well on Atorvastatin  Primary hypertension - well controlled, continue current dose on Losartan/hydrochlorothiazide.   Prostate cancer/post radiation tx - doing well monitoring PSA with Urology  Mild renal insufficiency - discussed goal for low sodium diet and good BP control. Rechecking with labs.    Follow up in 6 months, 30min for preventative

## 2025-05-15 LAB
ALBUMIN SERPL-MCNC: 4.9 G/DL (ref 3.6–5.1)
ALP SERPL-CCNC: 55 U/L (ref 35–144)
ALT SERPL-CCNC: 35 U/L (ref 9–46)
ANION GAP SERPL CALCULATED.4IONS-SCNC: 11 MMOL/L (CALC) (ref 7–17)
APPEARANCE UR: CLEAR
AST SERPL-CCNC: 31 U/L (ref 10–35)
BACTERIA #/AREA URNS HPF: ABNORMAL /HPF
BACTERIA UR CULT: ABNORMAL
BILIRUB SERPL-MCNC: 0.7 MG/DL (ref 0.2–1.2)
BILIRUB UR QL STRIP: NEGATIVE
BUN SERPL-MCNC: 19 MG/DL (ref 7–25)
CALCIUM SERPL-MCNC: 9.5 MG/DL (ref 8.6–10.3)
CHLORIDE SERPL-SCNC: 97 MMOL/L (ref 98–110)
CHOLEST SERPL-MCNC: 166 MG/DL
CHOLEST/HDLC SERPL: 2.2 (CALC)
CO2 SERPL-SCNC: 26 MMOL/L (ref 20–32)
COLOR UR: YELLOW
CREAT SERPL-MCNC: 1.17 MG/DL (ref 0.7–1.35)
EGFRCR SERPLBLD CKD-EPI 2021: 70 ML/MIN/1.73M2
ERYTHROCYTE [DISTWIDTH] IN BLOOD BY AUTOMATED COUNT: 13.5 % (ref 11–15)
GLUCOSE SERPL-MCNC: 92 MG/DL (ref 65–99)
GLUCOSE UR QL STRIP: NEGATIVE
HCT VFR BLD AUTO: 44.5 % (ref 38.5–50)
HDLC SERPL-MCNC: 76 MG/DL
HGB BLD-MCNC: 14.8 G/DL (ref 13.2–17.1)
HGB UR QL STRIP: ABNORMAL
HYALINE CASTS #/AREA URNS LPF: ABNORMAL /LPF
KETONES UR QL STRIP: ABNORMAL
LDLC SERPL CALC-MCNC: 75 MG/DL (CALC)
LEUKOCYTE ESTERASE UR QL STRIP: NEGATIVE
MCH RBC QN AUTO: 31.8 PG (ref 27–33)
MCHC RBC AUTO-ENTMCNC: 33.3 G/DL (ref 32–36)
MCV RBC AUTO: 95.7 FL (ref 80–100)
NITRITE UR QL STRIP: NEGATIVE
NONHDLC SERPL-MCNC: 90 MG/DL (CALC)
PH UR STRIP: 6.5 [PH] (ref 5–8)
PLATELET # BLD AUTO: 197 THOUSAND/UL (ref 140–400)
PMV BLD REES-ECKER: 10.4 FL (ref 7.5–12.5)
POTASSIUM SERPL-SCNC: 4.3 MMOL/L (ref 3.5–5.3)
PROT SERPL-MCNC: 7.4 G/DL (ref 6.1–8.1)
PROT UR QL STRIP: ABNORMAL
RBC # BLD AUTO: 4.65 MILLION/UL (ref 4.2–5.8)
RBC #/AREA URNS HPF: ABNORMAL /HPF
SERVICE CMNT-IMP: ABNORMAL
SODIUM SERPL-SCNC: 134 MMOL/L (ref 135–146)
SP GR UR STRIP: 1.01 (ref 1–1.03)
SQUAMOUS #/AREA URNS HPF: ABNORMAL /HPF
TRIGL SERPL-MCNC: 70 MG/DL
TSH SERPL-ACNC: 2.71 MIU/L (ref 0.4–4.5)
WBC # BLD AUTO: 5.2 THOUSAND/UL (ref 3.8–10.8)
WBC #/AREA URNS HPF: ABNORMAL /HPF

## 2025-05-16 ENCOUNTER — APPOINTMENT (OUTPATIENT)
Dept: PRIMARY CARE | Facility: CLINIC | Age: 65
End: 2025-05-16
Payer: COMMERCIAL

## 2025-05-19 RX ORDER — LOSARTAN POTASSIUM AND HYDROCHLOROTHIAZIDE 25; 100 MG/1; MG/1
1 TABLET ORAL DAILY
Qty: 90 TABLET | Refills: 1 | OUTPATIENT
Start: 2025-05-19

## 2025-05-23 ENCOUNTER — APPOINTMENT (OUTPATIENT)
Facility: CLINIC | Age: 65
End: 2025-05-23
Payer: COMMERCIAL

## 2025-05-23 VITALS — HEIGHT: 69 IN | WEIGHT: 203.8 LBS | BODY MASS INDEX: 30.18 KG/M2

## 2025-05-23 DIAGNOSIS — Z86.0101 PERSONAL HISTORY OF ADENOMATOUS AND SERRATED COLON POLYPS: Primary | ICD-10-CM

## 2025-05-23 DIAGNOSIS — K21.9 GASTROESOPHAGEAL REFLUX DISEASE WITHOUT ESOPHAGITIS: ICD-10-CM

## 2025-05-23 PROCEDURE — 3008F BODY MASS INDEX DOCD: CPT | Performed by: INTERNAL MEDICINE

## 2025-05-23 PROCEDURE — 99214 OFFICE O/P EST MOD 30 MIN: CPT | Performed by: INTERNAL MEDICINE

## 2025-05-23 NOTE — PROGRESS NOTES
Primary Care Provider: Daniel Fitzpatrick MD  Referring Provider: No ref. provider found  My final recommendations will be communicated back to the referring physician and/or primary care provider via shared medical records or via US mail.    Chief Complaint (Reason for visit):   Eliud Mckeon is a 64 y.o. male who presents for discuss colorectal cancer surveillance    ASSESSMENT AND PLAN     Assessment & Plan  Personal history of adenomatous and serrated colon polyps  2008 colonoscopy-history of polyps  Last colonoscopy 2019-no polyps    - I discussed the risk benefits alternatives of a repeat colonoscopy.  Patient was agreeable.    Orders:    Colonoscopy Screening; High Risk Patient; Future    Gastroesophageal reflux disease without esophagitis  Patient has mild symptoms of heartburn and acid reflux.  He will controls it by taking Pepcid as necessary, about twice a month    - I discussed the risk of Castillo's esophagus, especially in white males about the age of 50 with chronic reflux.  We discussed possibly doing an upper endoscopy.  I will send some educational material on Castillo's esophagus.  Patient will review it and get back to me.         I discussed the risks, benefits and alternative(s) of the procedure(s) with the patient. Pt verbalizes understanding and is willing to proceed. All questions were answered.    Alexandro Murray MD, MS    SUBJECTIVE   HPI: History obtained from patient.  Former patient of Dr. Lazcano    64-year-old male who comes to see me for discussing colonoscopy    2008 colonoscopy-history of polyps  Last colonoscopy 2019-no polyps    Patient has mild symptoms of heartburn and acid reflux.  He will controls it by taking Pepcid as necessary, about twice a month    REDFLAGS  Pt denies any blood in stool, black stools  Pt denies unintentional weight loss, fever, chills, night sweats  Pt denies family history of GI cancer    Medical History[1]    Surgical History[2]    Current  "Medications[3]    Allergies[4]  OBJECTIVE   PHYSICAL EXAM:  Ht 1.753 m (5' 9\")   Wt 92.4 kg (203 lb 12.8 oz)   BMI 30.10 kg/m²      LABS/IMAGING/SCOPES  Lab Results   Component Value Date    WBC 5.2 05/14/2025    HGB 14.8 05/14/2025    HCT 44.5 05/14/2025    MCV 95.7 05/14/2025     05/14/2025     Lab Results   Component Value Date    GLUCOSE 92 05/14/2025    CALCIUM 9.5 05/14/2025     (L) 05/14/2025    K 4.3 05/14/2025    CO2 26 05/14/2025    CL 97 (L) 05/14/2025    BUN 19 05/14/2025    CREATININE 1.17 05/14/2025     Lab Results   Component Value Date    ALT 35 05/14/2025    AST 31 05/14/2025    ALKPHOS 55 05/14/2025    BILITOT 0.7 05/14/2025       === 04/06/23 ===    CT UROGRAPHY WITH 3D VOLUME RENDERED IMAGING    - Impression -  1.  No obstructing urolithiasis or significant nephrolithiasis.  2. Unremarkable opacified portions of the bilateral ureters. The mid  to distal right ureter is not adequately opacified.  3. Thick-walled appearance of the urinary bladder, nonspecific given  underdistention. Clinically exclude cystitis.  4. Colonic diverticulosis without evidence of acute diverticulitis.    Alexandro Murray MD, MS         [1]   Past Medical History:  Diagnosis Date    Abdominal pain 05/12/2023    Alcohol abuse, daily use 05/12/2023    Blepharospasm 08/31/2018    Blepharospasm    Chest tightness 11/06/2023    Elevated liver enzymes 05/12/2023    Elevated PSA 05/12/2023    Impaired fasting glucose 12/02/2020    IFG (impaired fasting glucose)    Obesity 05/12/2023    Other urticaria 06/16/2017    Urticaria, acute    Personal history of other diseases of male genital organs     History of prostatitis    Personal history of other diseases of urinary system 01/19/2021    History of renal insufficiency syndrome    Personal history of other drug therapy 09/24/2015    History of influenza vaccination    Personal history of other infectious and parasitic diseases 08/19/2016    History of scabies    " Personal history of other infectious and parasitic diseases 08/07/2014    History of condyloma acuminatum    Personal history of other specified conditions 04/15/2016    History of urinary urgency    Personal history of other specified conditions 02/22/2016    History of fatigue    Personal history of other specified conditions 11/17/2014    History of dysuria    Personal history of other specified conditions 04/15/2016    History of urinary frequency    Personal history of other specified conditions 09/24/2015    History of abdominal pain    Rash and other nonspecific skin eruption 10/13/2017    Skin rash   [2]   Past Surgical History:  Procedure Laterality Date    OTHER SURGICAL HISTORY  01/19/2021    Prostate brachytherapy   [3]   Current Outpatient Medications   Medication Sig Dispense Refill    atorvastatin (Lipitor) 20 mg tablet TAKE ONE-HALF (1/2) TABLET DAILY 90 tablet 1    clobetasol (Temovate) 0.05 % cream APPLY TO AFFECTED AREAS OF BODY TWICE A DAY X 14 DAYS AT A TIME . AVOID FACE/GROIN      losartan-hydrochlorothiazide (Hyzaar) 100-25 mg tablet Take 1 tablet by mouth once daily. 90 tablet 1    tamsulosin (Flomax) 0.4 mg 24 hr capsule Take 1 capsule (0.4 mg) by mouth once daily. 30 capsule 5     No current facility-administered medications for this visit.   [4] No Known Allergies

## 2025-05-23 NOTE — LETTER
May 23, 2025     Daniel Fitzpatrick MD  [address], [address]  [place] [address]    Patient: Eliud Mckeon   YOB: 1960   Date of Visit: 5/23/2025       Dear Dr. Daniel Fitzpatrick MD:    Thank you for referring Eliud Mckeon to me for evaluation. Below are my notes for this consultation.  If you have questions, please do not hesitate to call me. I look forward to following your patient along with you.       Sincerely,     Alexandro Murray MD, MS      CC: No Recipients  ______________________________________________________________________________________    Primary Care Provider: Daniel Fitzpatrick MD  Referring Provider: No ref. provider found  My final recommendations will be communicated back to the referring physician and/or primary care provider via shared medical records or via US mail.    Chief Complaint (Reason for visit):   Eliud Mckeon is a 64 y.o. male who presents for discuss colorectal cancer surveillance    ASSESSMENT AND PLAN     Assessment & Plan  Personal history of adenomatous and serrated colon polyps  2008 colonoscopy-history of polyps  Last colonoscopy 2019-no polyps    - I discussed the risk benefits alternatives of a repeat colonoscopy.  Patient was agreeable.    Orders:  •  Colonoscopy Screening; High Risk Patient; Future    Gastroesophageal reflux disease without esophagitis  Patient has mild symptoms of heartburn and acid reflux.  He will controls it by taking Pepcid as necessary, about twice a month    - I discussed the risk of Castillo's esophagus, especially in white males about the age of 50 with chronic reflux.  We discussed possibly doing an upper endoscopy.  I will send some educational material on Castillo's esophagus.  Patient will review it and get back to me.         I discussed the risks, benefits and alternative(s) of the procedure(s) with the patient. Pt verbalizes understanding and is willing to proceed. All questions  "were answered.    Alexandro Murray MD, MS    SUBJECTIVE   HPI: History obtained from patient.  Former patient of Dr. Lazcano    64-year-old male who comes to see me for discussing colonoscopy    2008 colonoscopy-history of polyps  Last colonoscopy 2019-no polyps    Patient has mild symptoms of heartburn and acid reflux.  He will controls it by taking Pepcid as necessary, about twice a month    REDFLAGS  Pt denies any blood in stool, black stools  Pt denies unintentional weight loss, fever, chills, night sweats  Pt denies family history of GI cancer    Medical History[1]    Surgical History[2]    Current Medications[3]    Allergies[4]  OBJECTIVE   PHYSICAL EXAM:  Ht 1.753 m (5' 9\")   Wt 92.4 kg (203 lb 12.8 oz)   BMI 30.10 kg/m²      LABS/IMAGING/SCOPES  Lab Results   Component Value Date    WBC 5.2 05/14/2025    HGB 14.8 05/14/2025    HCT 44.5 05/14/2025    MCV 95.7 05/14/2025     05/14/2025     Lab Results   Component Value Date    GLUCOSE 92 05/14/2025    CALCIUM 9.5 05/14/2025     (L) 05/14/2025    K 4.3 05/14/2025    CO2 26 05/14/2025    CL 97 (L) 05/14/2025    BUN 19 05/14/2025    CREATININE 1.17 05/14/2025     Lab Results   Component Value Date    ALT 35 05/14/2025    AST 31 05/14/2025    ALKPHOS 55 05/14/2025    BILITOT 0.7 05/14/2025       === 04/06/23 ===    CT UROGRAPHY WITH 3D VOLUME RENDERED IMAGING    - Impression -  1.  No obstructing urolithiasis or significant nephrolithiasis.  2. Unremarkable opacified portions of the bilateral ureters. The mid  to distal right ureter is not adequately opacified.  3. Thick-walled appearance of the urinary bladder, nonspecific given  underdistention. Clinically exclude cystitis.  4. Colonic diverticulosis without evidence of acute diverticulitis.    Alexandro Murray MD, MS           [1]  Past Medical History:  Diagnosis Date   • Abdominal pain 05/12/2023   • Alcohol abuse, daily use 05/12/2023   • Blepharospasm 08/31/2018    Blepharospasm   • Chest " tightness 11/06/2023   • Elevated liver enzymes 05/12/2023   • Elevated PSA 05/12/2023   • Impaired fasting glucose 12/02/2020    IFG (impaired fasting glucose)   • Obesity 05/12/2023   • Other urticaria 06/16/2017    Urticaria, acute   • Personal history of other diseases of male genital organs     History of prostatitis   • Personal history of other diseases of urinary system 01/19/2021    History of renal insufficiency syndrome   • Personal history of other drug therapy 09/24/2015    History of influenza vaccination   • Personal history of other infectious and parasitic diseases 08/19/2016    History of scabies   • Personal history of other infectious and parasitic diseases 08/07/2014    History of condyloma acuminatum   • Personal history of other specified conditions 04/15/2016    History of urinary urgency   • Personal history of other specified conditions 02/22/2016    History of fatigue   • Personal history of other specified conditions 11/17/2014    History of dysuria   • Personal history of other specified conditions 04/15/2016    History of urinary frequency   • Personal history of other specified conditions 09/24/2015    History of abdominal pain   • Rash and other nonspecific skin eruption 10/13/2017    Skin rash   [2]  Past Surgical History:  Procedure Laterality Date   • OTHER SURGICAL HISTORY  01/19/2021    Prostate brachytherapy   [3]  Current Outpatient Medications   Medication Sig Dispense Refill   • atorvastatin (Lipitor) 20 mg tablet TAKE ONE-HALF (1/2) TABLET DAILY 90 tablet 1   • clobetasol (Temovate) 0.05 % cream APPLY TO AFFECTED AREAS OF BODY TWICE A DAY X 14 DAYS AT A TIME . AVOID FACE/GROIN     • losartan-hydrochlorothiazide (Hyzaar) 100-25 mg tablet Take 1 tablet by mouth once daily. 90 tablet 1   • tamsulosin (Flomax) 0.4 mg 24 hr capsule Take 1 capsule (0.4 mg) by mouth once daily. 30 capsule 5     No current facility-administered medications for this visit.   [4]  No Known Allergies

## 2025-09-02 DIAGNOSIS — E78.00 ELEVATED LDL CHOLESTEROL LEVEL: ICD-10-CM

## 2025-09-02 DIAGNOSIS — I25.10 CAD IN NATIVE ARTERY: Primary | ICD-10-CM

## 2025-09-02 RX ORDER — ROSUVASTATIN CALCIUM 20 MG/1
20 TABLET, COATED ORAL DAILY
Qty: 90 TABLET | Refills: 0 | Status: SHIPPED | OUTPATIENT
Start: 2025-09-02 | End: 2026-10-07

## 2025-10-09 ENCOUNTER — APPOINTMENT (OUTPATIENT)
Dept: DERMATOLOGY | Facility: CLINIC | Age: 65
End: 2025-10-09
Payer: COMMERCIAL

## 2025-11-21 ENCOUNTER — APPOINTMENT (OUTPATIENT)
Dept: PRIMARY CARE | Facility: CLINIC | Age: 65
End: 2025-11-21
Payer: COMMERCIAL

## 2025-12-04 ENCOUNTER — APPOINTMENT (OUTPATIENT)
Dept: DERMATOLOGY | Facility: CLINIC | Age: 65
End: 2025-12-04
Payer: COMMERCIAL

## 2025-12-12 ENCOUNTER — APPOINTMENT (OUTPATIENT)
Dept: GASTROENTEROLOGY | Facility: EXTERNAL LOCATION | Age: 65
End: 2025-12-12
Payer: COMMERCIAL